# Patient Record
Sex: FEMALE | Race: WHITE | NOT HISPANIC OR LATINO | Employment: OTHER | URBAN - NONMETROPOLITAN AREA
[De-identification: names, ages, dates, MRNs, and addresses within clinical notes are randomized per-mention and may not be internally consistent; named-entity substitution may affect disease eponyms.]

---

## 2017-02-07 ENCOUNTER — TRANSCRIBE ORDERS (OUTPATIENT)
Dept: ADMINISTRATIVE | Facility: HOSPITAL | Age: 72
End: 2017-02-07

## 2017-02-07 DIAGNOSIS — H53.2 DOUBLE VISION: Primary | ICD-10-CM

## 2017-02-10 ENCOUNTER — APPOINTMENT (OUTPATIENT)
Dept: CT IMAGING | Facility: HOSPITAL | Age: 72
End: 2017-02-10
Attending: OPHTHALMOLOGY

## 2017-02-16 ENCOUNTER — HOSPITAL ENCOUNTER (OUTPATIENT)
Dept: CT IMAGING | Facility: HOSPITAL | Age: 72
Discharge: HOME | End: 2017-02-16
Attending: OPHTHALMOLOGY

## 2017-02-16 ENCOUNTER — HOSPITAL ENCOUNTER (OUTPATIENT)
Dept: ULTRASOUND IMAGING | Facility: HOSPITAL | Age: 72
Discharge: HOME | End: 2017-02-16
Attending: OPHTHALMOLOGY | Admitting: OPHTHALMOLOGY

## 2017-02-16 DIAGNOSIS — H53.2 DOUBLE VISION: ICD-10-CM

## 2017-02-16 LAB — CREAT BLDA-MCNC: 0.8 MG/DL (ref 0.6–1.3)

## 2017-02-16 PROCEDURE — 70470 CT HEAD/BRAIN W/O & W/DYE: CPT

## 2017-02-16 PROCEDURE — 93880 EXTRACRANIAL BILAT STUDY: CPT | Performed by: SURGERY

## 2017-02-16 PROCEDURE — 93880 EXTRACRANIAL BILAT STUDY: CPT

## 2017-02-16 PROCEDURE — 0 IOPAMIDOL 61 % SOLUTION: Performed by: OPHTHALMOLOGY

## 2017-02-16 PROCEDURE — 82565 ASSAY OF CREATININE: CPT

## 2017-02-16 RX ADMIN — IOPAMIDOL 100 ML: 612 INJECTION, SOLUTION INTRAVENOUS at 13:15

## 2017-05-02 ENCOUNTER — OFFICE VISIT (OUTPATIENT)
Dept: SURGERY | Age: 72
End: 2017-05-02
Payer: MEDICARE

## 2017-05-02 VITALS
BODY MASS INDEX: 32.39 KG/M2 | WEIGHT: 176 LBS | TEMPERATURE: 98.5 F | SYSTOLIC BLOOD PRESSURE: 130 MMHG | HEIGHT: 62 IN | DIASTOLIC BLOOD PRESSURE: 72 MMHG

## 2017-05-02 DIAGNOSIS — K64.2 PROLAPSED INTERNAL HEMORRHOIDS, GRADE 3: Primary | ICD-10-CM

## 2017-05-02 PROCEDURE — 1090F PRES/ABSN URINE INCON ASSESS: CPT | Performed by: PHYSICIAN ASSISTANT

## 2017-05-02 PROCEDURE — G8400 PT W/DXA NO RESULTS DOC: HCPCS | Performed by: PHYSICIAN ASSISTANT

## 2017-05-02 PROCEDURE — 99203 OFFICE O/P NEW LOW 30 MIN: CPT | Performed by: PHYSICIAN ASSISTANT

## 2017-05-02 PROCEDURE — 3017F COLORECTAL CA SCREEN DOC REV: CPT | Performed by: PHYSICIAN ASSISTANT

## 2017-05-02 PROCEDURE — 4040F PNEUMOC VAC/ADMIN/RCVD: CPT | Performed by: PHYSICIAN ASSISTANT

## 2017-05-02 PROCEDURE — G8427 DOCREV CUR MEDS BY ELIG CLIN: HCPCS | Performed by: PHYSICIAN ASSISTANT

## 2017-05-02 PROCEDURE — G8419 CALC BMI OUT NRM PARAM NOF/U: HCPCS | Performed by: PHYSICIAN ASSISTANT

## 2017-05-02 PROCEDURE — 3014F SCREEN MAMMO DOC REV: CPT | Performed by: PHYSICIAN ASSISTANT

## 2017-05-02 PROCEDURE — 1036F TOBACCO NON-USER: CPT | Performed by: PHYSICIAN ASSISTANT

## 2017-05-02 PROCEDURE — 1123F ACP DISCUSS/DSCN MKR DOCD: CPT | Performed by: PHYSICIAN ASSISTANT

## 2017-05-02 RX ORDER — SIMVASTATIN 20 MG
20 TABLET ORAL DAILY
COMMUNITY
Start: 2017-03-30

## 2017-05-02 RX ORDER — MECLIZINE HYDROCHLORIDE CHEWABLE TABLETS 25 MG/1
25 TABLET, CHEWABLE ORAL 3 TIMES DAILY PRN
COMMUNITY

## 2017-05-02 RX ORDER — TRIAMTERENE AND HYDROCHLOROTHIAZIDE 37.5; 25 MG/1; MG/1
1 CAPSULE ORAL EVERY MORNING
COMMUNITY
Start: 2017-03-30

## 2017-05-02 RX ORDER — METRONIDAZOLE 500 MG/1
500 TABLET ORAL 3 TIMES DAILY PRN
Status: ON HOLD | COMMUNITY
End: 2017-05-12 | Stop reason: ALTCHOICE

## 2017-05-02 RX ORDER — ATENOLOL 25 MG/1
25 TABLET ORAL DAILY
COMMUNITY
Start: 2017-03-30

## 2017-05-02 RX ORDER — INDOMETHACIN 50 MG/1
50 CAPSULE ORAL NIGHTLY PRN
COMMUNITY

## 2017-05-02 RX ORDER — TRAMADOL HYDROCHLORIDE 50 MG/1
50 TABLET ORAL EVERY 6 HOURS PRN
COMMUNITY

## 2017-05-02 RX ORDER — PHENOL 1.4 %
1 AEROSOL, SPRAY (ML) MUCOUS MEMBRANE DAILY
COMMUNITY

## 2017-05-05 ENCOUNTER — HOSPITAL ENCOUNTER (OUTPATIENT)
Dept: PREADMISSION TESTING | Age: 72
Discharge: HOME | End: 2017-05-05
Payer: MEDICARE

## 2017-05-05 VITALS — WEIGHT: 169 LBS | HEIGHT: 62 IN | BODY MASS INDEX: 31.1 KG/M2

## 2017-05-05 LAB
ANION GAP SERPL CALCULATED.3IONS-SCNC: 12 MMOL/L (ref 7–19)
BASOPHILS ABSOLUTE: 0.1 K/UL (ref 0–0.2)
BASOPHILS RELATIVE PERCENT: 0.7 % (ref 0–1)
BUN BLDV-MCNC: 14 MG/DL (ref 8–23)
CALCIUM SERPL-MCNC: 9.7 MG/DL (ref 8.8–10.2)
CHLORIDE BLD-SCNC: 102 MMOL/L (ref 98–111)
CO2: 28 MMOL/L (ref 22–29)
CREAT SERPL-MCNC: 0.8 MG/DL (ref 0.5–0.9)
EOSINOPHILS ABSOLUTE: 0.3 K/UL (ref 0–0.6)
EOSINOPHILS RELATIVE PERCENT: 3.8 % (ref 0–5)
GFR NON-AFRICAN AMERICAN: >60
GLUCOSE BLD-MCNC: 74 MG/DL (ref 74–109)
HCT VFR BLD CALC: 37.7 % (ref 37–47)
HEMOGLOBIN: 12.2 G/DL (ref 12–16)
LYMPHOCYTES ABSOLUTE: 2.1 K/UL (ref 1.1–4.5)
LYMPHOCYTES RELATIVE PERCENT: 30.4 % (ref 20–40)
MCH RBC QN AUTO: 30.4 PG (ref 27–31)
MCHC RBC AUTO-ENTMCNC: 32.4 G/DL (ref 33–37)
MCV RBC AUTO: 94 FL (ref 81–99)
MONOCYTES ABSOLUTE: 0.5 K/UL (ref 0–0.9)
MONOCYTES RELATIVE PERCENT: 7.5 % (ref 0–10)
NEUTROPHILS ABSOLUTE: 3.9 K/UL (ref 1.5–7.5)
NEUTROPHILS RELATIVE PERCENT: 57.3 % (ref 50–65)
PDW BLD-RTO: 12.8 % (ref 11.5–14.5)
PLATELET # BLD: 236 K/UL (ref 130–400)
PMV BLD AUTO: 11.6 FL (ref 7.4–10.4)
POTASSIUM SERPL-SCNC: 3.9 MMOL/L (ref 3.5–5)
RBC # BLD: 4.01 M/UL (ref 4.2–5.4)
SODIUM BLD-SCNC: 142 MMOL/L (ref 136–145)
WBC # BLD: 6.8 K/UL (ref 4.8–10.8)

## 2017-05-05 PROCEDURE — 85025 COMPLETE CBC W/AUTO DIFF WBC: CPT

## 2017-05-05 PROCEDURE — 80048 BASIC METABOLIC PNL TOTAL CA: CPT

## 2017-05-05 PROCEDURE — 93005 ELECTROCARDIOGRAM TRACING: CPT

## 2017-05-08 ENCOUNTER — PREP FOR PROCEDURE (OUTPATIENT)
Dept: SURGERY | Age: 72
End: 2017-05-08

## 2017-05-08 LAB
EKG P AXIS: 3 DEGREES
EKG P-R INTERVAL: 152 MS
EKG Q-T INTERVAL: 394 MS
EKG QRS DURATION: 78 MS
EKG QTC CALCULATION (BAZETT): 393 MS
EKG T AXIS: 3 DEGREES

## 2017-05-08 RX ORDER — SODIUM CHLORIDE, SODIUM LACTATE, POTASSIUM CHLORIDE, CALCIUM CHLORIDE 600; 310; 30; 20 MG/100ML; MG/100ML; MG/100ML; MG/100ML
INJECTION, SOLUTION INTRAVENOUS CONTINUOUS
Status: CANCELLED | OUTPATIENT
Start: 2017-05-08

## 2017-05-08 RX ORDER — SODIUM CHLORIDE 0.9 % (FLUSH) 0.9 %
10 SYRINGE (ML) INJECTION EVERY 12 HOURS SCHEDULED
Status: CANCELLED | OUTPATIENT
Start: 2017-05-08

## 2017-05-08 RX ORDER — SODIUM CHLORIDE 0.9 % (FLUSH) 0.9 %
10 SYRINGE (ML) INJECTION PRN
Status: CANCELLED | OUTPATIENT
Start: 2017-05-08

## 2017-05-08 ASSESSMENT — ENCOUNTER SYMPTOMS
CONSTIPATION: 0
APNEA: 1
WHEEZING: 0
ANAL BLEEDING: 1
ALLERGIC/IMMUNOLOGIC NEGATIVE: 1
BLOOD IN STOOL: 1
DIARRHEA: 0
COUGH: 0
ABDOMINAL PAIN: 0
SHORTNESS OF BREATH: 0
EYES NEGATIVE: 1
NAUSEA: 0
VOMITING: 0
BACK PAIN: 1
ABDOMINAL DISTENTION: 0

## 2017-05-12 ENCOUNTER — ANESTHESIA (OUTPATIENT)
Dept: OPERATING ROOM | Age: 72
End: 2017-05-12
Payer: MEDICARE

## 2017-05-12 ENCOUNTER — HOSPITAL ENCOUNTER (OUTPATIENT)
Age: 72
Setting detail: OUTPATIENT SURGERY
Discharge: HOME | End: 2017-05-12
Attending: SURGERY | Admitting: SURGERY
Payer: MEDICARE

## 2017-05-12 ENCOUNTER — ANESTHESIA EVENT (OUTPATIENT)
Dept: OPERATING ROOM | Age: 72
End: 2017-05-12
Payer: MEDICARE

## 2017-05-12 VITALS
RESPIRATION RATE: 7 BRPM | DIASTOLIC BLOOD PRESSURE: 48 MMHG | SYSTOLIC BLOOD PRESSURE: 100 MMHG | OXYGEN SATURATION: 100 %

## 2017-05-12 VITALS
SYSTOLIC BLOOD PRESSURE: 144 MMHG | OXYGEN SATURATION: 98 % | TEMPERATURE: 97.2 F | HEART RATE: 55 BPM | RESPIRATION RATE: 16 BRPM | DIASTOLIC BLOOD PRESSURE: 63 MMHG

## 2017-05-12 PROCEDURE — 6370000000 HC RX 637 (ALT 250 FOR IP): Performed by: ANESTHESIOLOGY

## 2017-05-12 PROCEDURE — 2580000003 HC RX 258: Performed by: PHYSICIAN ASSISTANT

## 2017-05-12 PROCEDURE — 3600000003 HC SURGERY LEVEL 3 BASE: Performed by: SURGERY

## 2017-05-12 PROCEDURE — 2580000003 HC RX 258

## 2017-05-12 PROCEDURE — 3700000001 HC ADD 15 MINUTES (ANESTHESIA): Performed by: SURGERY

## 2017-05-12 PROCEDURE — 6360000002 HC RX W HCPCS: Performed by: ANESTHESIOLOGY

## 2017-05-12 PROCEDURE — 6360000002 HC RX W HCPCS: Performed by: PHYSICIAN ASSISTANT

## 2017-05-12 PROCEDURE — 88304 TISSUE EXAM BY PATHOLOGIST: CPT

## 2017-05-12 PROCEDURE — 2500000003 HC RX 250 WO HCPCS: Performed by: SURGERY

## 2017-05-12 PROCEDURE — 99999 PR OFFICE/OUTPT VISIT,PROCEDURE ONLY: CPT | Performed by: PHYSICIAN ASSISTANT

## 2017-05-12 PROCEDURE — 3700000000 HC ANESTHESIA ATTENDED CARE: Performed by: SURGERY

## 2017-05-12 PROCEDURE — 6360000002 HC RX W HCPCS

## 2017-05-12 PROCEDURE — 46260 REMOVE IN/EX HEM GROUPS 2+: CPT | Performed by: SURGERY

## 2017-05-12 PROCEDURE — 3600000013 HC SURGERY LEVEL 3 ADDTL 15MIN: Performed by: SURGERY

## 2017-05-12 PROCEDURE — 2500000003 HC RX 250 WO HCPCS

## 2017-05-12 PROCEDURE — 7100000010 HC PHASE II RECOVERY - FIRST 15 MIN: Performed by: SURGERY

## 2017-05-12 PROCEDURE — 7100000011 HC PHASE II RECOVERY - ADDTL 15 MIN: Performed by: SURGERY

## 2017-05-12 PROCEDURE — 7100000001 HC PACU RECOVERY - ADDTL 15 MIN: Performed by: SURGERY

## 2017-05-12 PROCEDURE — 7100000000 HC PACU RECOVERY - FIRST 15 MIN: Performed by: SURGERY

## 2017-05-12 PROCEDURE — 2720000001 HC MISC SURG SUPPLY STERILE $51-500: Performed by: SURGERY

## 2017-05-12 RX ORDER — METOCLOPRAMIDE HYDROCHLORIDE 5 MG/ML
10 INJECTION INTRAMUSCULAR; INTRAVENOUS
Status: DISCONTINUED | OUTPATIENT
Start: 2017-05-12 | End: 2017-05-12 | Stop reason: HOSPADM

## 2017-05-12 RX ORDER — ONDANSETRON 2 MG/ML
4 INJECTION INTRAMUSCULAR; INTRAVENOUS EVERY 6 HOURS PRN
Status: CANCELLED | OUTPATIENT
Start: 2017-05-12

## 2017-05-12 RX ORDER — MORPHINE SULFATE 4 MG/ML
4 INJECTION, SOLUTION INTRAMUSCULAR; INTRAVENOUS EVERY 5 MIN PRN
Status: DISCONTINUED | OUTPATIENT
Start: 2017-05-12 | End: 2017-05-12 | Stop reason: HOSPADM

## 2017-05-12 RX ORDER — SODIUM CHLORIDE 0.9 % (FLUSH) 0.9 %
10 SYRINGE (ML) INJECTION PRN
Status: CANCELLED | OUTPATIENT
Start: 2017-05-12

## 2017-05-12 RX ORDER — MORPHINE SULFATE 4 MG/ML
2 INJECTION, SOLUTION INTRAMUSCULAR; INTRAVENOUS EVERY 5 MIN PRN
Status: DISCONTINUED | OUTPATIENT
Start: 2017-05-12 | End: 2017-05-12 | Stop reason: HOSPADM

## 2017-05-12 RX ORDER — DIPHENHYDRAMINE HYDROCHLORIDE 50 MG/ML
12.5 INJECTION INTRAMUSCULAR; INTRAVENOUS
Status: DISCONTINUED | OUTPATIENT
Start: 2017-05-12 | End: 2017-05-12 | Stop reason: HOSPADM

## 2017-05-12 RX ORDER — BUPIVACAINE HYDROCHLORIDE AND EPINEPHRINE 2.5; 5 MG/ML; UG/ML
INJECTION, SOLUTION INFILTRATION; PERINEURAL PRN
Status: DISCONTINUED | OUTPATIENT
Start: 2017-05-12 | End: 2017-05-12 | Stop reason: HOSPADM

## 2017-05-12 RX ORDER — PROMETHAZINE HYDROCHLORIDE 25 MG/ML
6.25 INJECTION, SOLUTION INTRAMUSCULAR; INTRAVENOUS
Status: DISCONTINUED | OUTPATIENT
Start: 2017-05-12 | End: 2017-05-12 | Stop reason: HOSPADM

## 2017-05-12 RX ORDER — APREPITANT 40 MG/1
40 CAPSULE ORAL ONCE
Status: COMPLETED | OUTPATIENT
Start: 2017-05-12 | End: 2017-05-12

## 2017-05-12 RX ORDER — LABETALOL HYDROCHLORIDE 5 MG/ML
5 INJECTION, SOLUTION INTRAVENOUS EVERY 10 MIN PRN
Status: DISCONTINUED | OUTPATIENT
Start: 2017-05-12 | End: 2017-05-12 | Stop reason: HOSPADM

## 2017-05-12 RX ORDER — MEPERIDINE HYDROCHLORIDE 50 MG/ML
12.5 INJECTION INTRAMUSCULAR; INTRAVENOUS; SUBCUTANEOUS EVERY 5 MIN PRN
Status: DISCONTINUED | OUTPATIENT
Start: 2017-05-12 | End: 2017-05-12 | Stop reason: HOSPADM

## 2017-05-12 RX ORDER — LIDOCAINE HYDROCHLORIDE 10 MG/ML
INJECTION, SOLUTION EPIDURAL; INFILTRATION; INTRACAUDAL; PERINEURAL PRN
Status: DISCONTINUED | OUTPATIENT
Start: 2017-05-12 | End: 2017-05-12 | Stop reason: SDUPTHER

## 2017-05-12 RX ORDER — SODIUM CHLORIDE, SODIUM LACTATE, POTASSIUM CHLORIDE, CALCIUM CHLORIDE 600; 310; 30; 20 MG/100ML; MG/100ML; MG/100ML; MG/100ML
INJECTION, SOLUTION INTRAVENOUS CONTINUOUS
Status: DISCONTINUED | OUTPATIENT
Start: 2017-05-12 | End: 2017-05-12 | Stop reason: HOSPADM

## 2017-05-12 RX ORDER — ACETAMINOPHEN 325 MG/1
650 TABLET ORAL EVERY 4 HOURS PRN
Status: CANCELLED | OUTPATIENT
Start: 2017-05-12

## 2017-05-12 RX ORDER — SODIUM CHLORIDE 0.9 % (FLUSH) 0.9 %
10 SYRINGE (ML) INJECTION PRN
Status: DISCONTINUED | OUTPATIENT
Start: 2017-05-12 | End: 2017-05-12 | Stop reason: HOSPADM

## 2017-05-12 RX ORDER — HYDRALAZINE HYDROCHLORIDE 20 MG/ML
5 INJECTION INTRAMUSCULAR; INTRAVENOUS EVERY 10 MIN PRN
Status: DISCONTINUED | OUTPATIENT
Start: 2017-05-12 | End: 2017-05-12 | Stop reason: HOSPADM

## 2017-05-12 RX ORDER — PROPOFOL 10 MG/ML
INJECTION, EMULSION INTRAVENOUS PRN
Status: DISCONTINUED | OUTPATIENT
Start: 2017-05-12 | End: 2017-05-12 | Stop reason: SDUPTHER

## 2017-05-12 RX ORDER — ONDANSETRON 2 MG/ML
INJECTION INTRAMUSCULAR; INTRAVENOUS PRN
Status: DISCONTINUED | OUTPATIENT
Start: 2017-05-12 | End: 2017-05-12 | Stop reason: SDUPTHER

## 2017-05-12 RX ORDER — HYDROCODONE BITARTRATE AND ACETAMINOPHEN 5; 325 MG/1; MG/1
2 TABLET ORAL EVERY 4 HOURS PRN
Status: CANCELLED | OUTPATIENT
Start: 2017-05-12

## 2017-05-12 RX ORDER — SODIUM CHLORIDE, SODIUM LACTATE, POTASSIUM CHLORIDE, CALCIUM CHLORIDE 600; 310; 30; 20 MG/100ML; MG/100ML; MG/100ML; MG/100ML
INJECTION, SOLUTION INTRAVENOUS CONTINUOUS PRN
Status: DISCONTINUED | OUTPATIENT
Start: 2017-05-12 | End: 2017-05-12 | Stop reason: SDUPTHER

## 2017-05-12 RX ORDER — ENALAPRILAT 2.5 MG/2ML
1.25 INJECTION INTRAVENOUS
Status: DISCONTINUED | OUTPATIENT
Start: 2017-05-12 | End: 2017-05-12 | Stop reason: HOSPADM

## 2017-05-12 RX ORDER — MORPHINE SULFATE 4 MG/ML
2 INJECTION, SOLUTION INTRAMUSCULAR; INTRAVENOUS
Status: CANCELLED | OUTPATIENT
Start: 2017-05-12

## 2017-05-12 RX ORDER — HYDROCODONE BITARTRATE AND ACETAMINOPHEN 5; 325 MG/1; MG/1
TABLET ORAL
Qty: 35 TABLET | Refills: 0 | Status: SHIPPED | OUTPATIENT
Start: 2017-05-12 | End: 2017-05-25 | Stop reason: ALTCHOICE

## 2017-05-12 RX ORDER — MORPHINE SULFATE 4 MG/ML
4 INJECTION, SOLUTION INTRAMUSCULAR; INTRAVENOUS
Status: CANCELLED | OUTPATIENT
Start: 2017-05-12

## 2017-05-12 RX ORDER — FENTANYL CITRATE 50 UG/ML
INJECTION, SOLUTION INTRAMUSCULAR; INTRAVENOUS PRN
Status: DISCONTINUED | OUTPATIENT
Start: 2017-05-12 | End: 2017-05-12 | Stop reason: SDUPTHER

## 2017-05-12 RX ORDER — SODIUM CHLORIDE 0.9 % (FLUSH) 0.9 %
10 SYRINGE (ML) INJECTION EVERY 12 HOURS SCHEDULED
Status: DISCONTINUED | OUTPATIENT
Start: 2017-05-12 | End: 2017-05-12 | Stop reason: HOSPADM

## 2017-05-12 RX ORDER — SODIUM CHLORIDE 0.9 % (FLUSH) 0.9 %
10 SYRINGE (ML) INJECTION EVERY 12 HOURS SCHEDULED
Status: CANCELLED | OUTPATIENT
Start: 2017-05-12

## 2017-05-12 RX ORDER — SCOLOPAMINE TRANSDERMAL SYSTEM 1 MG/1
1 PATCH, EXTENDED RELEASE TRANSDERMAL ONCE
Status: DISCONTINUED | OUTPATIENT
Start: 2017-05-12 | End: 2017-05-12 | Stop reason: HOSPADM

## 2017-05-12 RX ORDER — MIDAZOLAM HYDROCHLORIDE 1 MG/ML
INJECTION INTRAMUSCULAR; INTRAVENOUS PRN
Status: DISCONTINUED | OUTPATIENT
Start: 2017-05-12 | End: 2017-05-12 | Stop reason: SDUPTHER

## 2017-05-12 RX ORDER — DEXAMETHASONE SODIUM PHOSPHATE 10 MG/ML
INJECTION INTRAMUSCULAR; INTRAVENOUS PRN
Status: DISCONTINUED | OUTPATIENT
Start: 2017-05-12 | End: 2017-05-12 | Stop reason: SDUPTHER

## 2017-05-12 RX ORDER — HYDROCODONE BITARTRATE AND ACETAMINOPHEN 5; 325 MG/1; MG/1
1 TABLET ORAL EVERY 4 HOURS PRN
Status: CANCELLED | OUTPATIENT
Start: 2017-05-12

## 2017-05-12 RX ADMIN — DEXAMETHASONE SODIUM PHOSPHATE 10 MG: 10 INJECTION INTRAMUSCULAR; INTRAVENOUS at 10:01

## 2017-05-12 RX ADMIN — MIDAZOLAM HYDROCHLORIDE 1 MG: 1 INJECTION, SOLUTION INTRAMUSCULAR; INTRAVENOUS at 09:42

## 2017-05-12 RX ADMIN — ONDANSETRON HYDROCHLORIDE 4 MG: 2 INJECTION, SOLUTION INTRAVENOUS at 10:01

## 2017-05-12 RX ADMIN — SODIUM CHLORIDE, SODIUM LACTATE, POTASSIUM CHLORIDE, AND CALCIUM CHLORIDE: 600; 310; 30; 20 INJECTION, SOLUTION INTRAVENOUS at 09:43

## 2017-05-12 RX ADMIN — Medication 2 G: at 09:50

## 2017-05-12 RX ADMIN — LIDOCAINE HYDROCHLORIDE 30 MG: 10 INJECTION, SOLUTION EPIDURAL; INFILTRATION; INTRACAUDAL; PERINEURAL at 09:48

## 2017-05-12 RX ADMIN — SODIUM CHLORIDE, SODIUM LACTATE, POTASSIUM CHLORIDE, AND CALCIUM CHLORIDE: 600; 310; 30; 20 INJECTION, SOLUTION INTRAVENOUS at 07:30

## 2017-05-12 RX ADMIN — APREPITANT 40 MG: 40 CAPSULE ORAL at 07:30

## 2017-05-12 RX ADMIN — PROPOFOL 100 MG: 10 INJECTION, EMULSION INTRAVENOUS at 09:48

## 2017-05-12 RX ADMIN — FENTANYL CITRATE 50 MCG: 50 INJECTION INTRAMUSCULAR; INTRAVENOUS at 09:48

## 2017-05-12 ASSESSMENT — PAIN DESCRIPTION - LOCATION: LOCATION: BUTTOCKS

## 2017-05-12 ASSESSMENT — PAIN DESCRIPTION - DESCRIPTORS: DESCRIPTORS: BURNING

## 2017-05-12 ASSESSMENT — PAIN DESCRIPTION - PROGRESSION: CLINICAL_PROGRESSION: NOT CHANGED

## 2017-05-12 ASSESSMENT — PAIN DESCRIPTION - ONSET: ONSET: AWAKENED FROM SLEEP

## 2017-05-12 ASSESSMENT — PAIN SCALES - GENERAL: PAINLEVEL_OUTOF10: 0

## 2017-05-12 ASSESSMENT — PAIN DESCRIPTION - FREQUENCY: FREQUENCY: CONTINUOUS

## 2017-05-25 ENCOUNTER — OFFICE VISIT (OUTPATIENT)
Dept: SURGERY | Age: 72
End: 2017-05-25

## 2017-05-25 VITALS
WEIGHT: 169.8 LBS | HEIGHT: 62 IN | BODY MASS INDEX: 31.25 KG/M2 | TEMPERATURE: 97 F | SYSTOLIC BLOOD PRESSURE: 120 MMHG | DIASTOLIC BLOOD PRESSURE: 72 MMHG

## 2017-05-25 DIAGNOSIS — Z87.19 S/P HEMORRHOIDECTOMY: Primary | ICD-10-CM

## 2017-05-25 DIAGNOSIS — Z98.890 S/P HEMORRHOIDECTOMY: Primary | ICD-10-CM

## 2017-05-25 PROCEDURE — 99024 POSTOP FOLLOW-UP VISIT: CPT | Performed by: SURGERY

## 2017-06-26 ENCOUNTER — OFFICE VISIT (OUTPATIENT)
Dept: SURGERY | Age: 72
End: 2017-06-26

## 2017-06-26 ENCOUNTER — DIRECT ADMIT ORDERS (OUTPATIENT)
Dept: SURGERY | Age: 72
End: 2017-06-26

## 2017-06-26 VITALS
BODY MASS INDEX: 31.91 KG/M2 | TEMPERATURE: 97.2 F | SYSTOLIC BLOOD PRESSURE: 124 MMHG | DIASTOLIC BLOOD PRESSURE: 78 MMHG | WEIGHT: 173.4 LBS | HEIGHT: 62 IN

## 2017-06-26 DIAGNOSIS — Z87.19 S/P HEMORRHOIDECTOMY: Primary | ICD-10-CM

## 2017-06-26 DIAGNOSIS — Z98.890 S/P HEMORRHOIDECTOMY: Primary | ICD-10-CM

## 2017-06-26 PROCEDURE — 99024 POSTOP FOLLOW-UP VISIT: CPT | Performed by: SURGERY

## 2017-10-24 ENCOUNTER — OFFICE VISIT (OUTPATIENT)
Dept: OBSTETRICS AND GYNECOLOGY | Facility: CLINIC | Age: 72
End: 2017-10-24

## 2017-10-24 VITALS
WEIGHT: 170 LBS | DIASTOLIC BLOOD PRESSURE: 80 MMHG | SYSTOLIC BLOOD PRESSURE: 136 MMHG | BODY MASS INDEX: 31.28 KG/M2 | HEIGHT: 62 IN

## 2017-10-24 DIAGNOSIS — N95.2 VAGINAL ATROPHY: ICD-10-CM

## 2017-10-24 DIAGNOSIS — Z78.9 NONSMOKER: ICD-10-CM

## 2017-10-24 DIAGNOSIS — N39.41 URGE INCONTINENCE: ICD-10-CM

## 2017-10-24 DIAGNOSIS — Z01.411 ENCOUNTER FOR GYNECOLOGICAL EXAMINATION WITH ABNORMAL FINDING: Primary | ICD-10-CM

## 2017-10-24 PROCEDURE — G0101 CA SCREEN;PELVIC/BREAST EXAM: HCPCS | Performed by: OBSTETRICS & GYNECOLOGY

## 2017-10-24 RX ORDER — ATENOLOL 25 MG/1
25 TABLET ORAL
COMMUNITY
Start: 2017-03-30

## 2017-10-24 RX ORDER — SIMVASTATIN 20 MG
20 TABLET ORAL
COMMUNITY
Start: 2017-03-30

## 2017-10-24 RX ORDER — MULTIVIT AND MINERALS-FERROUS GLUCONATE 9 MG IRON/15 ML ORAL LIQUID 9 MG/15 ML
LIQUID (ML) ORAL DAILY
COMMUNITY

## 2017-10-24 RX ORDER — TRIAMTERENE AND HYDROCHLOROTHIAZIDE 37.5; 25 MG/1; MG/1
CAPSULE ORAL
COMMUNITY
Start: 2017-03-30

## 2017-10-24 RX ORDER — AMOXICILLIN 250 MG
CAPSULE ORAL
COMMUNITY
End: 2023-03-23

## 2017-10-24 RX ORDER — PHENOL 1.4 %
AEROSOL, SPRAY (ML) MUCOUS MEMBRANE
COMMUNITY

## 2017-10-24 RX ORDER — MECLIZINE HCL 25MG 25 MG/1
25 TABLET, CHEWABLE ORAL
COMMUNITY

## 2017-10-24 RX ORDER — INDOMETHACIN 50 MG/1
50 CAPSULE ORAL
COMMUNITY

## 2017-10-24 NOTE — PROGRESS NOTES
"Subjective   Chief Complaint   Patient presents with   • Annual Exam     pt here today for annual exam. pt says that she has had recurrent UTI's in the past. pt voices no other concerns.      Keren Andrew is a 72 y.o. year old menopausal female presenting to be seen for her annual exam.  She is very unclear about her previous gyn surgical history.  She is sure that she had a \"bladder tie up\", but then reports that she either had a D&C/cone or a \"partial hysterectomy\", but is not sure.  The patient denies any vaginal bleeding in the past 12 months  Hot flashes and night sweats are not a significant problem.    She is occasionally sexually active.  In the past year there has not been new sexual partners.  She says they occasionally still try, but have some difficulty.    Patient reports regular self breast exams: no  She exercises regularly: yes.  Walking several days/week    She has noticed changes in height: no.    No Additional Complaints Reported    The following portions of the patient's history were reviewed and updated as appropriate:problem list, current medications, allergies, past family history, past medical history, past social history and past surgical history.  Smoking status: Never Smoker                                                              Smokeless status: Never Used                        Review of Systems   Constitutional: Negative for activity change and unexpected weight change.   HENT: Negative for congestion.    Eyes: Positive for visual disturbance (wears glasses).   Respiratory: Negative for shortness of breath.    Cardiovascular: Positive for chest pain (esophageal spasms).   Gastrointestinal: Positive for constipation (chronic constipation, not a good water drinker). Negative for abdominal pain, blood in stool and diarrhea.   Endocrine: Negative for cold intolerance and heat intolerance.   Genitourinary: Positive for dyspareunia and enuresis (urge incontinence, especially if she is " "too full). Negative for difficulty urinating, pelvic pain, vaginal bleeding and vaginal discharge.   Musculoskeletal: Negative for arthralgias, back pain, neck pain and neck stiffness.   Skin: Negative for rash.   Neurological: Positive for dizziness (vertigo). Negative for headaches.   Psychiatric/Behavioral: Negative for sleep disturbance. The patient is not nervous/anxious.          Objective   /80  Ht 62\" (157.5 cm)  Wt 170 lb (77.1 kg)  BMI 31.09 kg/m2  Physical Exam   Constitutional: She is oriented to person, place, and time. She appears well-developed and well-nourished. No distress.   HENT:   Head: Normocephalic and atraumatic.   Eyes: EOM are normal.   Neck: Normal range of motion. Neck supple. No thyromegaly present.   Cardiovascular: Normal rate and regular rhythm.    No murmur heard.  Pulmonary/Chest: Effort normal and breath sounds normal. Right breast exhibits no inverted nipple and no mass. Left breast exhibits no inverted nipple and no mass.   Abdominal: Soft. She exhibits no distension. There is no tenderness.   Genitourinary: Vagina normal. Rectal exam shows anal tone normal. No breast tenderness or discharge. Pelvic exam was performed with patient supine. There is no tenderness or lesion on the right labia. There is no tenderness or lesion on the left labia. Right adnexum displays no tenderness and no fullness. Left adnexum displays no tenderness and no fullness. No bleeding in the vagina. No vaginal discharge found.   Genitourinary Comments: Patient s/p hysterectomy:  Uterus and cervix surgically absent.  Vaginal cuff unremarkable.  Mucosa pale with loss of rugae.  Labia normal, no lesions noted.  Urethral meatus unremarkable, no prolapse of mucosa.  Anus/perineum normal   Musculoskeletal: Normal range of motion.   Neurological: She is alert and oriented to person, place, and time.   Skin: Skin is warm and dry.   Psychiatric: She has a normal mood and affect. Her behavior is normal. " Judgment normal.   Nursing note and vitals reviewed.         Assessment & Plan    Keren was seen today for annual exam.    Diagnoses and all orders for this visit:    Encounter for gynecological examination with abnormal finding: Written order given for mammogram; she will take it to [facility].  Patient s/p hysterectomy.  PAP not indicated; explained to patient.  Screening labs with PCP.  Return last bone density was 3 years ago and was normal.  She also reports that her last colonoscopy was 3 years ago and was normal.  The patient has 30 had her flu shot for this year.    Vaginal atrophy:  Discussed vaginal Premarin cream and Tere Alexandra touch as options for vaginal dryness that is causing the patient dyspareunia.  She has previously used Premarin vaginal cream, but it was for mild prolapse symptoms.  She does not want any prescription at this time.  The patient was given a brochure about Tere Alexandra touch.    Urge incontinence: Encourage the patient to try eliminating bladder irritants from her diet.  She volunteered that eliminating Diet Coke from her diet several years ago made a tremendous difference.  She was also encouraged to consider pelvic physical therapy.    Nonsmoker    BMI 31.0-31.9,adult      This note was electronically signed.    Kelly Milian MD  10/24/2017  1:37 PM

## 2017-10-26 ENCOUNTER — HOSPITAL ENCOUNTER (OUTPATIENT)
Dept: HOSPITAL 58 - RAD | Age: 72
Discharge: HOME | End: 2017-10-26
Attending: OBSTETRICS & GYNECOLOGY

## 2017-10-26 VITALS — BODY MASS INDEX: 29.2 KG/M2

## 2017-10-26 DIAGNOSIS — Z12.31: Primary | ICD-10-CM

## 2017-10-26 PROCEDURE — 77067 SCR MAMMO BI INCL CAD: CPT

## 2017-10-27 NOTE — MAMMO
EXAM:  Bilateral digital screening mammogram (2-D and 3-D) 

  

History:  Screening 

  

Comparison:  Bilateral mammogram 09/30/2015 

  

Findings:  MLO and CC views of bilateral breasts demonstrate scattered fibroglandular breast parenchy
ma.  CAD was reviewed by the radiologist.  Tomosynthesis was performed.  There are no dominant masses
, no suspicious microcalcifications and no architectural distortions 

  

Impression:  Stable negative mammogram.  Recommend followup routine screening mammography in 1 year. 


  

BIRADS 1

## 2018-07-10 ENCOUNTER — HOSPITAL ENCOUNTER (OUTPATIENT)
Dept: HOSPITAL 58 - RAD | Age: 73
Discharge: HOME | End: 2018-07-10
Attending: INTERNAL MEDICINE

## 2018-07-10 VITALS — BODY MASS INDEX: 29.2 KG/M2

## 2018-07-10 DIAGNOSIS — M25.472: ICD-10-CM

## 2018-07-10 DIAGNOSIS — M25.572: Primary | ICD-10-CM

## 2018-07-10 NOTE — DI
EXAM: Left foot three view 

  

HISTORY: 

Pain and swelling 

COMPARISON: None available at the time of dictation. 

  

FINDINGS: 

  

No definitive left foot acute fracture or dislocation is identified. There is moderate appearing join
t space loss seen at the left first metatarsophalangeal joint suggestive of degenerative arthritis.  
There is a tiny posterior left calcaneal spur present.. No definitive soft tissue  radiodense foreign
 bodies are identified. 

  

IMPRESSION: 

  

No left foot acute fracture or dislocation identified. 

  

Moderate appearing degenerative changes seen at the left first metatarsophalangeal joint. 

  

Tiny posterior left calcaneal spur.

## 2018-07-10 NOTE — DI
EXAM:  LEFT ANKLE 3 VIEWS 

  

HISTORY:  Pain and swelling 

  

FINDINGS:  No comparison. Trabecular markings are accentuated consistent with at least mild, diffuse 
demineralization.  There is bony spurring of the fibular tip.  Tibiotalar joint is within normal limi
ts.  There is no joint effusion, joint dislocation or acute fracture line identified. 

  

  

IMPRESSION: 

Mild generalized demineralization.  Early arthropathy.

## 2018-10-19 ENCOUNTER — HOSPITAL ENCOUNTER (OUTPATIENT)
Dept: HOSPITAL 58 - RAD | Age: 73
Discharge: HOME | End: 2018-10-19
Attending: INTERNAL MEDICINE
Payer: COMMERCIAL

## 2018-10-19 VITALS — BODY MASS INDEX: 29.2 KG/M2

## 2018-10-19 DIAGNOSIS — Z12.31: Primary | ICD-10-CM

## 2018-10-19 PROCEDURE — 77067 SCR MAMMO BI INCL CAD: CPT

## 2018-10-22 NOTE — MAMMO
EXAM:  Bilateral digital screening mammogram (2-D and 3-D) 

  

History:  Screening 

  

Comparison:  Bilateral mammogram 10/26/2017 

  

Findings:  MLO and CC views of bilateral breasts demonstrate scattered fibroglandular breast parenchy
ma.  CAD was reviewed by the radiologist.  Tomosynthesis was performed.  There are no dominant masses
, no suspicious microcalcifications and no architectural distortions. 

  

Impression:  Stable negative mammogram.  Recommend followup routine screening mammography in 1 year. 


  

BIRADS 2

## 2018-10-25 ENCOUNTER — OFFICE VISIT (OUTPATIENT)
Dept: OBSTETRICS AND GYNECOLOGY | Facility: CLINIC | Age: 73
End: 2018-10-25

## 2018-10-25 VITALS
WEIGHT: 169 LBS | SYSTOLIC BLOOD PRESSURE: 126 MMHG | BODY MASS INDEX: 31.1 KG/M2 | HEIGHT: 62 IN | DIASTOLIC BLOOD PRESSURE: 70 MMHG

## 2018-10-25 DIAGNOSIS — L90.0 LICHEN SCLEROSUS: ICD-10-CM

## 2018-10-25 DIAGNOSIS — Z12.31 ENCOUNTER FOR SCREENING MAMMOGRAM FOR BREAST CANCER: ICD-10-CM

## 2018-10-25 DIAGNOSIS — Z78.9 NONSMOKER: ICD-10-CM

## 2018-10-25 DIAGNOSIS — Z01.411 ENCOUNTER FOR GYNECOLOGICAL EXAMINATION WITH ABNORMAL FINDING: Primary | ICD-10-CM

## 2018-10-25 PROCEDURE — G0101 CA SCREEN;PELVIC/BREAST EXAM: HCPCS | Performed by: OBSTETRICS & GYNECOLOGY

## 2018-10-25 RX ORDER — CLOBETASOL PROPIONATE 0.5 MG/G
CREAM TOPICAL 2 TIMES DAILY
Qty: 45 G | Refills: 1 | Status: SHIPPED | OUTPATIENT
Start: 2018-10-25 | End: 2019-10-25 | Stop reason: SDUPTHER

## 2018-11-09 ENCOUNTER — TELEPHONE (OUTPATIENT)
Dept: OBSTETRICS AND GYNECOLOGY | Facility: CLINIC | Age: 73
End: 2018-11-09

## 2018-11-09 NOTE — TELEPHONE ENCOUNTER
Pt said she was given Myrbetriq 25mg samples at her appt 10/25/18 and said it is doing well and would like a script sent into [facility] in [place]

## 2018-11-12 NOTE — TELEPHONE ENCOUNTER
Please confirm pharmacy.  She has [facility] in [place] as her Default.  It is fine to send script with 5 refills once you have confirmed pharmacy; I was doing it but realized her pharmacy was likely a mistake since they are in opposite directions.  Thx

## 2018-11-13 DIAGNOSIS — Z12.31 ENCOUNTER FOR SCREENING MAMMOGRAM FOR BREAST CANCER: ICD-10-CM

## 2018-12-10 DIAGNOSIS — N39.41 URGE INCONTINENCE: Primary | ICD-10-CM

## 2018-12-10 NOTE — TELEPHONE ENCOUNTER
Patient requesting that Myrbetriq rx be sent to [facility] instead of her local pharmacy. Patient states that she did not do this before since she had just started the medication and was unsure if she would like it. Patient is doing well with medication and is up to date on yearly. Medication is sent in per patients request.

## 2018-12-18 DIAGNOSIS — N39.41 URGE INCONTINENCE: ICD-10-CM

## 2019-04-25 ENCOUNTER — HOSPITAL ENCOUNTER (OUTPATIENT)
Dept: HOSPITAL 58 - RAD | Age: 74
Discharge: HOME | End: 2019-04-25
Attending: INTERNAL MEDICINE

## 2019-04-25 VITALS — BODY MASS INDEX: 29.2 KG/M2

## 2019-04-25 DIAGNOSIS — R42: Primary | ICD-10-CM

## 2019-04-25 NOTE — US
EXAM:  ULTRASOUND CAROTID DUPLEX, BILATERAL 

  

HISTORY:  Dizziness 

  

FINDINGS:  Gray-scale ultrasound, color Doppler and spectral analysis was performed.  Velocities are 
in meters per second. 

  

 By gray scale and color Doppler imaging, there were regions of heterogeneous plaque formation identi
fied within the carotid bulbs and internal carotid arteries.  These regions of plaque appeared to rem
ain less than 50% vessel diameter. 

  

  

RIGHT: 

External carotid artery peak systolic velocity:  0.84 

Common carotid artery peak systolic velocity/end diastolic velocity:  0.61/0.18 

Internal carotid artery peak systolic velocity:  1.11 

ICA/CCA peak systolic velocity ratio:  1.8 

ICA end diastolic velocity:  0.27 

  

LEFT: 

External carotid artery peak systolic velocity:  0.81 

Common carotid artery peak systolic velocity/end diastolic velocity:  1.1/0.26 

Internal carotid artery peak systolic velocity:  1.2 

ICA/CCA peak systolic velocity ratio:  1.1 

ICA end diastolic velocity:  0.39 

  

The right and left vertebral arteries were antegrade. 

  

  

IMPRESSION: 

1.  By gray scale and color Doppler imaging, there were regions of heterogeneous plaque formation shanelle
ntified within the carotid bulbs and internal carotid arteries.  These regions of plaque appeared to 
remain less than 50% vessel diameter. 

2.  Internal carotid artery peak systolic velocities and ICA/CCA peak systolic velocity ratios indica
te no hemodynamically significant stenosis bilaterally. 

3.  Both vertebral arteries were antegrade.

## 2019-04-25 NOTE — MRI
Examination:  MRI of the brain with and without contrast 04/25/2019 

  

Clinical information:  Dizzy. 

  

Comparison:  None. 

  

  

TECHNIQUE:  Sagittal postcontrast T1, axial pre and postcontrast T1, axial T2, axial FLAIR, coronal T
2 STAR, coronal postcontrast T1 and diffusion weighted imaging was performed. 

  

FINDINGS:  The midline structures and craniocervical junction are unremarkable.  The ventricles are n
ormal in size and configuration.  There is no mass effect, midline shift or extra-axial abnormality. 
 There are mild chronic microvascular ischemic changes within the supratentorial white matter.  No in
fratentorial signal abnormalities seen. 

  

No diffusion-weighted abnormality is present.  Specifically, there is no evidence of an acute infarct
.  There are expected flow voids within the major intracranial arterial vascular structures.  There i
s no MR evidence of intracranial hemorrhage.  No pathologic intracranial contrast enhancement seen.  
There is rightward nasal septal deviation. 

  

Impression: 

1.  No evidence of an acute infarct. 

2.  Mild chronic microvascular ischemic changes within the supratentorial white matter. 

3.  Otherwise, unremarkable MRI brain with and without contrast.

## 2019-10-25 ENCOUNTER — OFFICE VISIT (OUTPATIENT)
Dept: OBSTETRICS AND GYNECOLOGY | Facility: CLINIC | Age: 74
End: 2019-10-25

## 2019-10-25 VITALS
SYSTOLIC BLOOD PRESSURE: 136 MMHG | BODY MASS INDEX: 27.38 KG/M2 | HEIGHT: 61 IN | WEIGHT: 145 LBS | DIASTOLIC BLOOD PRESSURE: 80 MMHG

## 2019-10-25 DIAGNOSIS — Z12.31 ENCOUNTER FOR SCREENING MAMMOGRAM FOR BREAST CANCER: Primary | ICD-10-CM

## 2019-10-25 DIAGNOSIS — N39.41 URGE INCONTINENCE: ICD-10-CM

## 2019-10-25 DIAGNOSIS — L90.0 LICHEN SCLEROSUS: ICD-10-CM

## 2019-10-25 PROCEDURE — 99213 OFFICE O/P EST LOW 20 MIN: CPT | Performed by: OBSTETRICS & GYNECOLOGY

## 2019-10-25 RX ORDER — CLOBETASOL PROPIONATE 0.5 MG/G
CREAM TOPICAL 2 TIMES DAILY
Qty: 45 G | Refills: 1 | Status: SHIPPED | OUTPATIENT
Start: 2019-10-25 | End: 2019-10-28 | Stop reason: SDUPTHER

## 2019-10-28 ENCOUNTER — TELEPHONE (OUTPATIENT)
Dept: OBSTETRICS AND GYNECOLOGY | Facility: CLINIC | Age: 74
End: 2019-10-28

## 2019-10-28 DIAGNOSIS — L90.0 LICHEN SCLEROSUS: ICD-10-CM

## 2019-10-28 RX ORDER — CLOBETASOL PROPIONATE 0.5 MG/G
CREAM TOPICAL 2 TIMES DAILY
Qty: 45 G | Refills: 1 | Status: SHIPPED | OUTPATIENT
Start: 2019-10-28 | End: 2023-02-23 | Stop reason: SDUPTHER

## 2019-12-05 DIAGNOSIS — N39.41 URGE INCONTINENCE: ICD-10-CM

## 2019-12-12 RX ORDER — MIRABEGRON 25 MG/1
TABLET, FILM COATED, EXTENDED RELEASE ORAL
Qty: 90 TABLET | Refills: 4 | Status: SHIPPED | OUTPATIENT
Start: 2019-12-12 | End: 2020-10-27

## 2020-10-27 ENCOUNTER — OFFICE VISIT (OUTPATIENT)
Dept: OBSTETRICS AND GYNECOLOGY | Facility: CLINIC | Age: 75
End: 2020-10-27

## 2020-10-27 VITALS
BODY MASS INDEX: 27.79 KG/M2 | DIASTOLIC BLOOD PRESSURE: 82 MMHG | WEIGHT: 151 LBS | SYSTOLIC BLOOD PRESSURE: 152 MMHG | HEIGHT: 62 IN

## 2020-10-27 DIAGNOSIS — Z78.0 MENOPAUSE: Primary | ICD-10-CM

## 2020-10-27 DIAGNOSIS — Z12.31 ENCOUNTER FOR SCREENING MAMMOGRAM FOR MALIGNANT NEOPLASM OF BREAST: ICD-10-CM

## 2020-10-27 DIAGNOSIS — Z78.9 NONSMOKER: ICD-10-CM

## 2020-10-27 DIAGNOSIS — N39.41 URGE INCONTINENCE: ICD-10-CM

## 2020-10-27 DIAGNOSIS — N39.41 URGE INCONTINENCE OF URINE: ICD-10-CM

## 2020-10-27 PROCEDURE — G0101 CA SCREEN;PELVIC/BREAST EXAM: HCPCS | Performed by: OBSTETRICS & GYNECOLOGY

## 2020-10-27 NOTE — PROGRESS NOTES
Subjective   Chief Complaint   Patient presents with   • Annual Exam     pt here today for annual exam. pt voices needing refill on myrbetriq. pt voices no concerns.      Keren Andrew is a 75 y.o. year old No obstetric history on file. menopausal female presenting to be seen for her annual exam.  The patient is status-post hysterectomy  Hot flashes and night sweats are not a significant problem.  Keren reports that despite her myrbetriq, she has started having more leakage, mostly dribbling on the way to the bathroom, for the past 2-3 months.  Patient had an episode of syncope on May 4th - she had a two day hospital admission and it was determined that her dose of atenolol needed to be reduced.  Patient had an extensive cardiac work-up at that time, and reports that it was all normal.  She had a UTI about a month ago.    She is not sexually active, due to pain    Patient reports regular self breast exams: no  She exercises regularly: yes.  1.5 miles every day  She has noticed changes in height: yes.    No Additional Complaints Reported    The following portions of the patient's history were reviewed and updated as appropriate:problem list, current medications, allergies, past family history, past medical history, past social history and past surgical history.  Social History    Tobacco Use      Smoking status: Never Smoker      Smokeless tobacco: Never Used    Review of Systems   Constitutional: Negative for activity change and unexpected weight change.   HENT: Negative for congestion.    Eyes: Positive for visual disturbance (wears glasses).   Respiratory: Negative for shortness of breath.    Cardiovascular: Negative for chest pain.   Gastrointestinal: Positive for constipation (chronic). Negative for abdominal pain, blood in stool and diarrhea.   Endocrine: Negative for cold intolerance and heat intolerance.   Genitourinary: Positive for difficulty urinating (just feels like flow is slower than it used to be).  "Negative for pelvic pain, vaginal bleeding, vaginal discharge and vaginal pain (no irritation as long as she uses temovate cream regularly).   Musculoskeletal: Negative for arthralgias, back pain, neck pain and neck stiffness.   Skin: Negative for rash.   Neurological: Positive for syncope (none since May 2020). Negative for dizziness and headaches.   Psychiatric/Behavioral: Negative for dysphoric mood and sleep disturbance. The patient is not nervous/anxious.          Objective   /82   Ht 157.5 cm (62\")   Wt 68.5 kg (151 lb)   BMI 27.62 kg/m²   Physical Exam  Vitals signs and nursing note reviewed. Exam conducted with a chaperone present.   Constitutional:       General: She is not in acute distress.     Appearance: She is well-developed.   HENT:      Head: Normocephalic and atraumatic.   Neck:      Musculoskeletal: Normal range of motion and neck supple.      Thyroid: No thyromegaly.   Cardiovascular:      Rate and Rhythm: Normal rate and regular rhythm.      Heart sounds: No murmur.   Pulmonary:      Effort: Pulmonary effort is normal.      Breath sounds: Normal breath sounds.   Chest:      Breasts:         Right: No inverted nipple or mass.         Left: No inverted nipple or mass.   Abdominal:      General: There is no distension.      Palpations: Abdomen is soft.      Tenderness: There is no abdominal tenderness.   Genitourinary:     General: Normal vulva.      Exam position: Lithotomy position.      Labia:         Right: No tenderness or lesion.         Left: No tenderness or lesion.       Urethra: No prolapse.      Vagina: Normal. No vaginal discharge or bleeding.      Uterus: Absent.       Adnexa:         Right: No tenderness or fullness.          Left: No tenderness or fullness.        Rectum: No external hemorrhoid or internal hemorrhoid. Normal anal tone.      Comments: Patient s/p hysterectomy:  Uterus and cervix surgically absent.  Vaginal cuff unremarkable.  Labia normal, no lesions noted.  " Urethral meatus unremarkable, no prolapse of mucosa.  Anus/perineum normal  Musculoskeletal: Normal range of motion.   Skin:     General: Skin is warm and dry.   Neurological:      Mental Status: She is alert and oriented to person, place, and time.   Psychiatric:         Behavior: Behavior normal.         Judgment: Judgment normal.            Assessment & Plan    Diagnoses and all orders for this visit:    1. Menopause (Primary): Doing well; exam unremarkable.  S/p hysterectomy; no pelvic organ prolapse.  No longer sexually active.  Patient reports regular exercise.  Monthly SBE was encouraged; mammogram and DEXA ordered.  Routine screening labs with PCP.  Overall, patient reports that she is feeling well.  -     DEXA Bone Density Axial; Future  -     Mammo Screening Bilateral With CAD; Future    2. Encounter for screening mammogram for malignant neoplasm of breast   -     Mammo Screening Bilateral With CAD; Future    3. Urge incontinence of urine: Patient had been doing well on 25 mg dose of Myrbetriq, but has been having more urge incontinence the past 2-3 months.  Checking for possible infection, and also sending higher dose of medication.  -     Urinalysis With Microscopic If Indicated (No Culture) - Urine, Clean Catch  -     Urine Culture - Urine, Urine, Clean Catch    4. Urge incontinence  -     Mirabegron ER (MYRBETRIQ) 50 MG tablet sustained-release 24 hour 24 hr tablet; Take 25 mg by mouth Daily.  Dispense: 90 tablet; Refill: 2    5. BMI 27.0-27.9,adult: exercises daily    6. Nonsmoker        This note was electronically signed.    Kelly Milian MD  10/27/2020  11:20 CDT

## 2020-10-29 LAB
APPEARANCE UR: CLEAR
BACTERIA UR CULT: NO GROWTH
BACTERIA UR CULT: NORMAL
BILIRUB UR QL STRIP: NEGATIVE
COLOR UR: YELLOW
GLUCOSE UR QL: NEGATIVE
HGB UR QL STRIP: NEGATIVE
KETONES UR QL STRIP: NEGATIVE
LEUKOCYTE ESTERASE UR QL STRIP: NEGATIVE
NITRITE UR QL STRIP: NEGATIVE
PH UR STRIP: 7.5 [PH] (ref 5–8)
PROT UR QL STRIP: NEGATIVE
SP GR UR: 1.01 (ref 1–1.03)
UROBILINOGEN UR STRIP-MCNC: NORMAL MG/DL

## 2020-11-09 ENCOUNTER — HOSPITAL ENCOUNTER (OUTPATIENT)
Dept: BONE DENSITY | Facility: HOSPITAL | Age: 75
Discharge: HOME | End: 2020-11-09

## 2020-11-09 ENCOUNTER — HOSPITAL ENCOUNTER (OUTPATIENT)
Dept: MAMMOGRAPHY | Facility: HOSPITAL | Age: 75
Discharge: HOME | End: 2020-11-09

## 2020-11-09 DIAGNOSIS — Z78.0 MENOPAUSE: ICD-10-CM

## 2020-11-09 DIAGNOSIS — Z12.31 ENCOUNTER FOR SCREENING MAMMOGRAM FOR MALIGNANT NEOPLASM OF BREAST: ICD-10-CM

## 2020-11-09 PROCEDURE — 77080 DXA BONE DENSITY AXIAL: CPT

## 2020-11-09 PROCEDURE — 77067 SCR MAMMO BI INCL CAD: CPT

## 2020-11-09 PROCEDURE — 77063 BREAST TOMOSYNTHESIS BI: CPT

## 2020-11-12 NOTE — PROGRESS NOTES
Please let patient know that she has osteopenia and that I recommend Ca++, vitamin D, and weight-bearing exercise.  We will plan to repeat DEXA scan in 2022.  Thx

## 2020-11-24 ENCOUNTER — TELEPHONE (OUTPATIENT)
Dept: OBSTETRICS AND GYNECOLOGY | Facility: CLINIC | Age: 75
End: 2020-11-24

## 2020-11-24 NOTE — TELEPHONE ENCOUNTER
"Pt increased Myrbetriq dose to 50mg 10/27/2020 and states she \"cannot handle that much, it is causing severe dry mouth.\" Pt wishes to decrease back to 25mg. Do you want a new RX or okay for pt to break in half?  "

## 2021-01-21 NOTE — TELEPHONE ENCOUNTER
Patient calls stating that Myrbetriq should have been sent to [facility] instead of local pharmacy.

## 2021-02-25 ENCOUNTER — TELEPHONE (OUTPATIENT)
Dept: OTOLARYNGOLOGY | Facility: CLINIC | Age: 76
End: 2021-02-25

## 2021-03-22 ENCOUNTER — OFFICE VISIT (OUTPATIENT)
Dept: OTOLARYNGOLOGY | Facility: CLINIC | Age: 76
End: 2021-03-22

## 2021-03-22 VITALS
BODY MASS INDEX: 27.79 KG/M2 | WEIGHT: 151 LBS | DIASTOLIC BLOOD PRESSURE: 78 MMHG | TEMPERATURE: 98 F | RESPIRATION RATE: 20 BRPM | SYSTOLIC BLOOD PRESSURE: 134 MMHG | HEIGHT: 62 IN | HEART RATE: 82 BPM

## 2021-03-22 DIAGNOSIS — Z79.02 ANTIPLATELET OR ANTITHROMBOTIC LONG-TERM USE: ICD-10-CM

## 2021-03-22 DIAGNOSIS — D37.04: Primary | ICD-10-CM

## 2021-03-22 PROCEDURE — 99203 OFFICE O/P NEW LOW 30 MIN: CPT | Performed by: OTOLARYNGOLOGY

## 2021-03-22 NOTE — PROGRESS NOTES
PRIMARY CARE PROVIDER: Francisco Barrios MD  REFERRING PROVIDER: No ref. provider found    Chief Complaint   Patient presents with   • Skin Lesion     lower lip lesion       Subjective   History of Present Illness:  Keren Andrew is a  75 y.o. female who presents for consultation regarding a lesion of the right lower wet vermilion (lip).  The lesion has the following characteristics:    Location: Right lower lip  Quality: Raised lesion  Severity: Moderate  Duration: Approximately 6 months  Timing: constant  Modifying Factors: None  Associated Signs & Symptoms: None    Her concern arises mostly because she had a large portion of her lower eyelid removed by Dr. Herndon with subsequent reconstruction for what sounds a basal cell carcinoma.    Review of Systems:  Review of Systems   Constitutional: Negative for chills, fatigue, fever and unexpected weight change.   HENT: Negative for facial swelling.    Respiratory: Negative for cough, chest tightness and shortness of breath.    Cardiovascular: Negative for chest pain.   Musculoskeletal: Negative for neck pain.   Skin: Negative for color change.   Neurological: Negative for facial asymmetry.   Hematological: Negative for adenopathy. Does not bruise/bleed easily.       Past History:  Past Medical History:   Diagnosis Date   • Constipation    • Gout    • Hyperlipidemia    • Hypertension    • Neoplasm of uncertain behavior     lower lip   • Plantar fasciitis      Past Surgical History:   Procedure Laterality Date   • BLADDER SUSPENSION     • EYE SURGERY Left     basal cell carcinoma    • GANGLION CYST EXCISION     • HYSTERECTOMY      Abdominal hysterectomy bilateral salpingectomy-pt voices she only has 1 ovary but doesnt know which one     Family History   Problem Relation Age of Onset   • Diabetes Father    • Cancer Father    • Prostate cancer Father    • Diabetes Brother    • Diabetes Maternal Grandmother    • Breast cancer Neg Hx      Social History     Tobacco Use   •  Smoking status: Never Smoker   • Smokeless tobacco: Never Used   Substance Use Topics   • Alcohol use: Not Currently   • Drug use: Never     Allergies:  Sulfa antibiotics    Current Outpatient Medications:   •  Aspirin Buf,CaCarb-MgCarb-MgO, 81 MG tablet, Take 81 mg by mouth., Disp: , Rfl:   •  atenolol (TENORMIN) 25 MG tablet, 25 mg. 4 times weekly, Disp: , Rfl:   •  calcium carbonate (OS-SAMANTA) 600 MG tablet, Take  by mouth., Disp: , Rfl:   •  clobetasol (TEMOVATE) 0.05 % cream, Apply  topically to the appropriate area as directed 2 (Two) Times a Day., Disp: 45 g, Rfl: 1  •  DICLOFENAC PO, Take  by mouth., Disp: , Rfl:   •  indomethacin (INDOCIN) 50 MG capsule, Take 50 mg by mouth., Disp: , Rfl:   •  meclizine 25 MG chewable tablet chewable tablet, Chew 25 mg., Disp: , Rfl:   •  Mirabegron ER (Myrbetriq) 25 MG tablet sustained-release 24 hour 24 hr tablet, Take 1 tablet by mouth Daily., Disp: 90 tablet, Rfl: 2  •  Multiple Vitamins-Minerals (MULTIVITAMIN AND MINERALS) liquid liquid, Take  by mouth Daily., Disp: , Rfl:   •  senna-docusate (PERICOLACE) 8.6-50 MG per tablet, Take  by mouth., Disp: , Rfl:   •  simvastatin (ZOCOR) 20 MG tablet, Take 20 mg by mouth., Disp: , Rfl:   •  triamterene-hydrochlorothiazide (DYAZIDE) 37.5-25 MG per capsule, Take  by mouth., Disp: , Rfl:     Objective     Vital Signs:  Temp:  [98 °F (36.7 °C)] 98 °F (36.7 °C)  Heart Rate:  [82] 82  Resp:  [20] 20  BP: (134)/(78) 134/78    Physical Exam:  Physical Exam  Vitals and nursing note reviewed.   Constitutional:       General: She is not in acute distress.     Appearance: She is well-developed. She is not diaphoretic.   HENT:      Head: Normocephalic and atraumatic.      Right Ear: External ear normal.      Left Ear: External ear normal.      Nose: Nose normal.      Mouth/Throat:     Eyes:      General: No scleral icterus.        Right eye: No discharge.         Left eye: No discharge.      Conjunctiva/sclera: Conjunctivae normal.       Pupils: Pupils are equal, round, and reactive to light.   Neck:      Thyroid: No thyromegaly.      Vascular: No JVD.      Trachea: No tracheal deviation.   Pulmonary:      Effort: Pulmonary effort is normal.      Breath sounds: No stridor.   Musculoskeletal:         General: No deformity. Normal range of motion.      Cervical back: Normal range of motion and neck supple.   Lymphadenopathy:      Cervical: No cervical adenopathy.   Skin:     General: Skin is warm and dry.      Coloration: Skin is not pale.      Findings: No erythema or rash.   Neurological:      Mental Status: She is alert and oriented to person, place, and time.      Cranial Nerves: No cranial nerve deficit.      Coordination: Coordination normal.   Psychiatric:         Speech: Speech normal.         Behavior: Behavior normal. Behavior is cooperative.         Thought Content: Thought content normal.         Judgment: Judgment normal.         Operative plan:  Excision of minor salivary neoplasm of uncertain behavior.    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 Antiplatelet or antithrombotic long-term use        Plan   Plan:  I have recommended excisional biopsy of the lesion with permanent section analysis.  Should this come back as a malignancy, she may require further excision.    Discussion of lesion. Discussed risks, benefits, alternatives, and possible complications of excision of the lesion with reconstruction utilizing local tissue rearrangement, full-thickness skin grafting, or local interpolated flaps. Risks include, but are not limited too: bleeding, infection, hematoma, recurrence, need for additional procedures, flap failure, cosmetic deformity. Patient understands risks and would like to proceed with surgery.     My findings and recommendations were discussed and questions were answered.     Christian Montiel MD  03/22/21  15:44 CDT

## 2021-03-23 ENCOUNTER — TRANSCRIBE ORDERS (OUTPATIENT)
Dept: LAB | Facility: HOSPITAL | Age: 76
End: 2021-03-23

## 2021-03-23 DIAGNOSIS — Z01.818 PRE-OP TESTING: Primary | ICD-10-CM

## 2021-03-25 ENCOUNTER — LAB (OUTPATIENT)
Dept: LAB | Facility: HOSPITAL | Age: 76
End: 2021-03-25

## 2021-03-25 LAB — SARS-COV-2 ORF1AB RESP QL NAA+PROBE: NOT DETECTED

## 2021-03-25 PROCEDURE — U0004 COV-19 TEST NON-CDC HGH THRU: HCPCS | Performed by: OTOLARYNGOLOGY

## 2021-03-25 PROCEDURE — C9803 HOPD COVID-19 SPEC COLLECT: HCPCS | Performed by: OTOLARYNGOLOGY

## 2021-03-25 PROCEDURE — U0005 INFEC AGEN DETEC AMPLI PROBE: HCPCS | Performed by: OTOLARYNGOLOGY

## 2021-03-29 ENCOUNTER — PROCEDURE VISIT (OUTPATIENT)
Dept: OTOLARYNGOLOGY | Facility: CLINIC | Age: 76
End: 2021-03-29

## 2021-03-29 VITALS — HEART RATE: 68 BPM | TEMPERATURE: 96.9 F | DIASTOLIC BLOOD PRESSURE: 92 MMHG | SYSTOLIC BLOOD PRESSURE: 151 MMHG

## 2021-03-29 DIAGNOSIS — D37.04: Primary | ICD-10-CM

## 2021-03-29 PROCEDURE — 88304 TISSUE EXAM BY PATHOLOGIST: CPT | Performed by: OTOLARYNGOLOGY

## 2021-03-29 PROCEDURE — 42405 BIOPSY OF SALIVARY GLAND: CPT | Performed by: OTOLARYNGOLOGY

## 2021-03-29 NOTE — PROGRESS NOTES
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  This palpated like a minor salivary gland.    OPERATIVE FINDINGS: The mass measured 7 mm and was submucosal.    OPERATIVE DETAILS: After patient verification consent was obtained, the lesion was marked.  The patient was handed a mirror where we confirmed the location and verified the consent.  The mucosa was cleansed with alcohol, and infiltrated with 1% lidocaine with 1-100,000 epinephrine directly over the lesion, and in the distribution of the right mental nerve.    After approximately 15 minutes, the patient's sensation was tested.  Anesthesia was deemed appropriate.  A 15 blade was used to make a fusiform incision overlying the mass, and curved iris scissors were used to dissect the mass from the surrounding tissue.  Hemostasis was obtained with bipolar cautery set at 20.  I then closed the area with a buried 5-0 Vicryl suture followed by running, locking 4-0 chromic.      DISPOSITION:  The procedures were completed without complication and tolerated well.  The patient was released in the company of herself to return home in satisfactory condition.  A follow-up appointment has been  scheduled, routine post-op medications prescribed (if required), and post-op instructions were given to the responsible party.           Christian Montiel MD, FACS  Board Certified Facial Plastic and Reconstructive Surgery  Board Certified Otolaryngology -- Head and Neck   Electronically signed by Christian Montiel MD, 03/29/21, 9:35 AM CDT.

## 2021-03-29 NOTE — PATIENT INSTRUCTIONS
CONTACT INFORMATION:  The main office phone number is [phone]. For emergencies after hours and on weekends, this number will convert over to our answering service and the on call provider will answer. Please try to keep non emergent phone calls/ questions to office hours 9am-5pm Monday through Friday.     Pet[employer]  As an alternative, you can sign up and use the Epic MyChart system for more direct and quicker access for non emergent questions/ problems.  [facility] Pet[employer] allows you to send messages to your doctor, view your test results, renew your prescriptions, schedule appointments, and more. To sign up, go to [employer] and click on the Sign Up Now link in the New User? box. Enter your [employer] Activation Code exactly as it appears below along with the last four digits of your Social Security Number and your Date of Birth () to complete the sign-up process. If you do not sign up before the expiration date, you must request a new code.    [employer] Activation Code: [email or web address]  Expires: 2021  3:59 PM    If you have questions, you can email [email or web address] or call [phone] to talk to our [employer] staff. Remember, [employer] is NOT to be used for urgent needs. For medical emergencies, dial 911.

## 2021-04-03 LAB
CYTO UR: NORMAL
LAB AP CASE REPORT: NORMAL
LAB AP CLINICAL INFORMATION: NORMAL
PATH REPORT.FINAL DX SPEC: NORMAL
PATH REPORT.GROSS SPEC: NORMAL

## 2021-04-12 ENCOUNTER — OFFICE VISIT (OUTPATIENT)
Dept: OTOLARYNGOLOGY | Facility: CLINIC | Age: 76
End: 2021-04-12

## 2021-04-12 DIAGNOSIS — Z48.89 SUTURE CHECK: Primary | ICD-10-CM

## 2021-04-12 PROCEDURE — 99024 POSTOP FOLLOW-UP VISIT: CPT | Performed by: OTOLARYNGOLOGY

## 2021-04-12 NOTE — PROGRESS NOTES
Pt here for suture check.  No s/s of infection noted.  Pt c/o of numbness and tenderness to lip.  Dr. Montiel spoke with pt regarding her concerns.

## 2021-07-14 ENCOUNTER — OFFICE VISIT (OUTPATIENT)
Dept: OTOLARYNGOLOGY | Facility: CLINIC | Age: 76
End: 2021-07-14

## 2021-07-14 VITALS
WEIGHT: 151 LBS | TEMPERATURE: 98 F | DIASTOLIC BLOOD PRESSURE: 74 MMHG | BODY MASS INDEX: 27.79 KG/M2 | RESPIRATION RATE: 20 BRPM | HEIGHT: 62 IN | SYSTOLIC BLOOD PRESSURE: 135 MMHG | HEART RATE: 65 BPM

## 2021-07-14 DIAGNOSIS — R68.2 DRY MOUTH: ICD-10-CM

## 2021-07-14 DIAGNOSIS — K13.0 MUCOUS RETENTION CYST OF LIP: Primary | ICD-10-CM

## 2021-07-14 PROCEDURE — 99213 OFFICE O/P EST LOW 20 MIN: CPT | Performed by: OTOLARYNGOLOGY

## 2021-07-14 NOTE — PATIENT INSTRUCTIONS
CONTACT INFORMATION:  The main office phone number is [phone]. For emergencies after hours and on weekends, this number will convert over to our answering service and the on call provider will answer. Please try to keep non emergent phone calls/ questions to office hours 9am-5pm Monday through Friday.     Idea[employer]  As an alternative, you can sign up and use the Epic MyChart system for more direct and quicker access for non emergent questions/ problems.  [facility] Idea[employer]ffer allows you to send messages to your doctor, view your test results, renew your prescriptions, schedule appointments, and more. To sign up, go to [employer] and click on the Sign Up Now link in the New User? box. Enter your [employer] Activation Code exactly as it appears below along with the last four digits of your Social Security Number and your Date of Birth () to complete the sign-up process. If you do not sign up before the expiration date, you must request a new code.    Ide[employer]ffer Activation Code: [email or web address]  Expires: 2021  9:16 AM    If you have questions, you can email [email or web address] or call [phone] to talk to our [employer] staff. Remember, [employer] is NOT to be used for urgent needs. For medical emergencies, dial 911.

## 2021-07-14 NOTE — PROGRESS NOTES
PRIMARY CARE PROVIDER: Francisco Barrios MD  REFERRING PROVIDER: No ref. provider found    Chief Complaint   Patient presents with   • Follow-up     lower lip       Subjective   History of Present Illness:  Keren Andrew is a  75 y.o. female who presents for follow-up regarding her lower lip.  She underwent excision of an enlarged, irritated minor salivary gland on March 29, 2021.  Since the surgery, she has done well.  She does report numbness has persisted medial to the incision on the lower lip since the surgery.  Over the past few weeks, she has noted some odd sensations in this area consistent with nerve tingling.    Back prior to May 2020, she was having issues with dry mouth.  She is actually admitted the hospital for what sounds like a presyncopal episode.  She was diagnosed with a urinary tract infection, diverticulitis, and dehydration.  Since then, she has been focusing on hydration, and her dry mouth is nearly resolved.    Review of Systems:  Review of Systems   Constitutional: Negative for chills, fatigue, fever and unexpected weight change.   HENT: Negative for facial swelling.    Respiratory: Negative for cough, chest tightness and shortness of breath.    Cardiovascular: Negative for chest pain.   Musculoskeletal: Negative for neck pain.   Skin: Negative for color change.   Neurological: Negative for facial asymmetry.   Hematological: Negative for adenopathy. Does not bruise/bleed easily.       Past History:  Past Medical History:   Diagnosis Date   • Constipation    • Gout    • Hyperlipidemia    • Hypertension    • Neoplasm of uncertain behavior     lower lip   • Plantar fasciitis      Past Surgical History:   Procedure Laterality Date   • BLADDER SUSPENSION     • EYE SURGERY Left     basal cell carcinoma    • GANGLION CYST EXCISION     • HYSTERECTOMY      Abdominal hysterectomy bilateral salpingectomy-pt voices she only has 1 ovary but doesnt know which one     Family History   Problem Relation Age  of Onset   • Diabetes Father    • Cancer Father    • Prostate cancer Father    • Diabetes Brother    • Diabetes Maternal Grandmother    • Breast cancer Neg Hx      Social History     Tobacco Use   • Smoking status: Never Smoker   • Smokeless tobacco: Never Used   Substance Use Topics   • Alcohol use: Not Currently   • Drug use: Never     Allergies:  Sulfa antibiotics    Current Outpatient Medications:   •  Aspirin Buf,CaCarb-MgCarb-MgO, 81 MG tablet, Take 81 mg by mouth., Disp: , Rfl:   •  atenolol (TENORMIN) 25 MG tablet, 25 mg. 4 times weekly, Disp: , Rfl:   •  calcium carbonate (OS-SAMANTA) 600 MG tablet, Take  by mouth., Disp: , Rfl:   •  clobetasol (TEMOVATE) 0.05 % cream, Apply  topically to the appropriate area as directed 2 (Two) Times a Day., Disp: 45 g, Rfl: 1  •  DICLOFENAC PO, Take  by mouth., Disp: , Rfl:   •  indomethacin (INDOCIN) 50 MG capsule, Take 50 mg by mouth., Disp: , Rfl:   •  meclizine 25 MG chewable tablet chewable tablet, Chew 25 mg., Disp: , Rfl:   •  Mirabegron ER (Myrbetriq) 25 MG tablet sustained-release 24 hour 24 hr tablet, Take 1 tablet by mouth Daily., Disp: 90 tablet, Rfl: 2  •  Multiple Vitamins-Minerals (MULTIVITAMIN AND MINERALS) liquid liquid, Take  by mouth Daily., Disp: , Rfl:   •  senna-docusate (PERICOLACE) 8.6-50 MG per tablet, Take  by mouth., Disp: , Rfl:   •  simvastatin (ZOCOR) 20 MG tablet, Take 20 mg by mouth., Disp: , Rfl:   •  triamterene-hydrochlorothiazide (DYAZIDE) 37.5-25 MG per capsule, Take  by mouth., Disp: , Rfl:       Objective     Vital Signs:  Temp:  [98 °F (36.7 °C)] 98 °F (36.7 °C)  Heart Rate:  [65] 65  Resp:  [20] 20  BP: (135)/(74) 135/74    Physical Exam:  Physical Exam  Vitals and nursing note reviewed.   Constitutional:       General: She is not in acute distress.     Appearance: She is well-developed. She is not diaphoretic.   HENT:      Head: Normocephalic and atraumatic.      Right Ear: External ear normal.      Left Ear: External ear normal.       Nose: Nose normal.      Mouth/Throat:     Eyes:      General: No scleral icterus.        Right eye: No discharge.         Left eye: No discharge.      Conjunctiva/sclera: Conjunctivae normal.      Pupils: Pupils are equal, round, and reactive to light.   Neck:      Thyroid: No thyromegaly.      Vascular: No JVD.      Trachea: No tracheal deviation.   Pulmonary:      Effort: Pulmonary effort is normal.      Breath sounds: No stridor.   Musculoskeletal:         General: No deformity. Normal range of motion.      Cervical back: Normal range of motion and neck supple.   Lymphadenopathy:      Cervical: No cervical adenopathy.   Skin:     General: Skin is warm and dry.      Coloration: Skin is not pale.      Findings: No erythema or rash.   Neurological:      Mental Status: She is alert and oriented to person, place, and time.      Cranial Nerves: No cranial nerve deficit.      Coordination: Coordination normal.   Psychiatric:         Speech: Speech normal.         Behavior: Behavior normal. Behavior is cooperative.         Thought Content: Thought content normal.         Judgment: Judgment normal.         Results Review:   Her pathology was reviewed and is consistent with a mucous retention cyst with chronic inflammation:      Assessment   Assessment:  1. Mucous retention cyst of lip    2. Dry mouth        Plan   Plan:  I would expect the numbness to resolve within the next year.  She is already experiencing paresthesias, which is consistent with nerve regeneration.  I discussed the possibility of Sjogren's syndrome, which I think is unlikely.  She did have an episode of dehydration and dry mouth, that has resolved when she focuses on her hydration.  Follow-up as needed.      My findings and recommendations were discussed and questions were answered.     Christian Montiel MD  07/14/21  09:16 CDT

## 2021-10-15 RX ORDER — MIRABEGRON 25 MG/1
TABLET, FILM COATED, EXTENDED RELEASE ORAL
Qty: 90 TABLET | Refills: 0 | Status: SHIPPED | OUTPATIENT
Start: 2021-10-15 | End: 2021-10-28 | Stop reason: SDUPTHER

## 2021-10-28 ENCOUNTER — OFFICE VISIT (OUTPATIENT)
Dept: OBSTETRICS AND GYNECOLOGY | Facility: CLINIC | Age: 76
End: 2021-10-28

## 2021-10-28 VITALS
SYSTOLIC BLOOD PRESSURE: 128 MMHG | HEIGHT: 62 IN | DIASTOLIC BLOOD PRESSURE: 74 MMHG | WEIGHT: 156 LBS | BODY MASS INDEX: 28.71 KG/M2

## 2021-10-28 DIAGNOSIS — Z78.9 NONSMOKER: ICD-10-CM

## 2021-10-28 DIAGNOSIS — Z12.31 ENCOUNTER FOR SCREENING MAMMOGRAM FOR MALIGNANT NEOPLASM OF BREAST: Primary | ICD-10-CM

## 2021-10-28 DIAGNOSIS — Z01.419 ENCOUNTER FOR GYNECOLOGICAL EXAMINATION WITHOUT ABNORMAL FINDING: ICD-10-CM

## 2021-10-28 DIAGNOSIS — Z12.11 ENCOUNTER FOR SCREENING COLONOSCOPY: ICD-10-CM

## 2021-10-28 DIAGNOSIS — Z78.0 MENOPAUSE: ICD-10-CM

## 2021-10-28 DIAGNOSIS — N39.46 MIXED INCONTINENCE: ICD-10-CM

## 2021-10-28 PROCEDURE — G0101 CA SCREEN;PELVIC/BREAST EXAM: HCPCS | Performed by: OBSTETRICS & GYNECOLOGY

## 2021-11-03 ENCOUNTER — OFFICE VISIT (OUTPATIENT)
Dept: OBSTETRICS AND GYNECOLOGY | Facility: CLINIC | Age: 76
End: 2021-11-03

## 2021-11-03 ENCOUNTER — TELEPHONE (OUTPATIENT)
Dept: OBSTETRICS AND GYNECOLOGY | Facility: CLINIC | Age: 76
End: 2021-11-03

## 2021-11-03 VITALS
BODY MASS INDEX: 28.34 KG/M2 | DIASTOLIC BLOOD PRESSURE: 80 MMHG | WEIGHT: 154 LBS | HEIGHT: 62 IN | SYSTOLIC BLOOD PRESSURE: 134 MMHG

## 2021-11-03 DIAGNOSIS — N39.46 MIXED INCONTINENCE: ICD-10-CM

## 2021-11-03 DIAGNOSIS — R30.0 DYSURIA: Primary | ICD-10-CM

## 2021-11-03 DIAGNOSIS — N30.00 ACUTE CYSTITIS WITHOUT HEMATURIA: ICD-10-CM

## 2021-11-03 LAB
BILIRUB BLD-MCNC: NEGATIVE MG/DL
CLARITY, POC: ABNORMAL
COLOR UR: YELLOW
GLUCOSE UR STRIP-MCNC: NEGATIVE MG/DL
KETONES UR QL: NEGATIVE
LEUKOCYTE EST, POC: ABNORMAL
NITRITE UR-MCNC: NEGATIVE MG/ML
PH UR: 8 [PH] (ref 5–8)
PROT UR STRIP-MCNC: NEGATIVE MG/DL
RBC # UR STRIP: ABNORMAL /UL
SP GR UR: 1.01 (ref 1–1.03)
UROBILINOGEN UR QL: NORMAL

## 2021-11-03 PROCEDURE — 81002 URINALYSIS NONAUTO W/O SCOPE: CPT | Performed by: OBSTETRICS & GYNECOLOGY

## 2021-11-03 PROCEDURE — 99213 OFFICE O/P EST LOW 20 MIN: CPT | Performed by: OBSTETRICS & GYNECOLOGY

## 2021-11-03 RX ORDER — NITROFURANTOIN 25; 75 MG/1; MG/1
100 CAPSULE ORAL 2 TIMES DAILY
Qty: 14 CAPSULE | Refills: 0 | Status: SHIPPED | OUTPATIENT
Start: 2021-11-03 | End: 2022-01-28

## 2021-11-03 NOTE — TELEPHONE ENCOUNTER
Pt called with c/o UTI symptoms   Pt is having pain and burning and requesting an appt today.  Pt added to Dr Milian schedule for 1 pm today.   Pt voices understanding.

## 2021-11-09 ENCOUNTER — OFFICE VISIT (OUTPATIENT)
Dept: GASTROENTEROLOGY | Facility: CLINIC | Age: 76
End: 2021-11-09

## 2021-11-09 VITALS
BODY MASS INDEX: 28.34 KG/M2 | HEART RATE: 69 BPM | HEIGHT: 62 IN | SYSTOLIC BLOOD PRESSURE: 130 MMHG | OXYGEN SATURATION: 99 % | WEIGHT: 154 LBS | DIASTOLIC BLOOD PRESSURE: 78 MMHG | TEMPERATURE: 96.7 F

## 2021-11-09 DIAGNOSIS — Z12.11 ENCOUNTER FOR SCREENING FOR MALIGNANT NEOPLASM OF COLON: Primary | ICD-10-CM

## 2021-11-09 PROCEDURE — S0260 H&P FOR SURGERY: HCPCS | Performed by: NURSE PRACTITIONER

## 2021-11-09 NOTE — PROGRESS NOTES
Chief Complaint   Patient presents with   • Colonoscopy     had colon 2010 in ill. she thinks  needs colon       PCP: Francisco Barrios MD  REFER: Kelly Milian MD    Subjective     HPI    Keren Andrew is a 76 y.o. female who presents to office for preventative maintenance.  There is not  a personal history of colon polyps.  There is not a history of colon cancer.  She does not have complaints of nausea/vomiting, change in bowels, weight loss, no BRBPR, no melena.  There is not a family history of colon cancer in first degree relative.  There is not a family history of colon polyps in first degree relative.  Her last colonoscopy-apx 2010-per pt .  Bowels do not move on regular basis without use of daily stool softener and enema once per week.  This is not new and has been occurring for many years.  She has never utilized miralax.       Past Medical History:   Diagnosis Date   • Constipation    • Gout    • Hyperlipidemia    • Hypertension    • Neoplasm of uncertain behavior     lower lip   • Plantar fasciitis      Past Surgical History:   Procedure Laterality Date   • BACK SURGERY     • BLADDER SUSPENSION     • EYE SURGERY Left     basal cell carcinoma    • GANGLION CYST EXCISION     • HEMORROIDECTOMY     • HYSTERECTOMY      Abdominal hysterectomy bilateral salpingectomy-pt voices she only has 1 ovary but doesnt know which one     Outpatient Medications Marked as Taking for the 11/9/21 encounter (Office Visit) with Ozzy White APRN   Medication Sig Dispense Refill   • Aspirin Buf,CaCarb-MgCarb-MgO, 81 MG tablet Take 81 mg by mouth.     • atenolol (TENORMIN) 25 MG tablet 25 mg. 4 times weekly     • calcium carbonate (OS-SAMANTA) 600 MG tablet Take  by mouth.     • clobetasol (TEMOVATE) 0.05 % cream Apply  topically to the appropriate area as directed 2 (Two) Times a Day. 45 g 1   • DICLOFENAC PO Take  by mouth.     • indomethacin (INDOCIN) 50 MG capsule Take 50 mg by mouth.     • meclizine 25 MG chewable  tablet chewable tablet Chew 25 mg.     • Mirabegron ER (Myrbetriq) 25 MG tablet sustained-release 24 hour 24 hr tablet Take 1 tablet by mouth Daily. 90 tablet 3   • Multiple Vitamins-Minerals (MULTIVITAMIN AND MINERALS) liquid liquid Take  by mouth Daily.     • senna-docusate (PERICOLACE) 8.6-50 MG per tablet Take  by mouth.     • simvastatin (ZOCOR) 20 MG tablet Take 20 mg by mouth.     • triamterene-hydrochlorothiazide (DYAZIDE) 37.5-25 MG per capsule Take  by mouth.       Allergies   Allergen Reactions   • Ciprofibrate Nausea And Vomiting   • Sulfa Antibiotics Rash     Social History     Socioeconomic History   • Marital status:    Tobacco Use   • Smoking status: Never Smoker   • Smokeless tobacco: Never Used   Substance and Sexual Activity   • Alcohol use: Yes     Comment: rare   • Drug use: Never   • Sexual activity: Not Currently     Partners: Male     Review of Systems   Constitutional: Negative for unexpected weight change.   Respiratory: Negative for shortness of breath.    Cardiovascular: Negative for chest pain.   Gastrointestinal: Negative for abdominal pain and anal bleeding.     Objective   Vitals:    11/09/21 1440   BP: 130/78   Pulse: 69   Temp: 96.7 °F (35.9 °C)   SpO2: 99%     Physical Exam  Constitutional:       Appearance: Normal appearance. She is well-developed.   Eyes:      General: No scleral icterus.  Cardiovascular:      Rate and Rhythm: Regular rhythm.      Heart sounds: Normal heart sounds. No murmur heard.      Pulmonary:      Effort: Pulmonary effort is normal. No accessory muscle usage.      Breath sounds: Normal breath sounds.   Abdominal:      General: Bowel sounds are normal. There is no distension.      Palpations: Abdomen is soft. There is no mass.      Tenderness: There is no abdominal tenderness. There is no guarding or rebound.   Skin:     General: Skin is warm and dry.      Coloration: Skin is not jaundiced.   Neurological:      Mental Status: She is alert.    Psychiatric:         Behavior: Behavior is cooperative.       Imaging Results (Most Recent)     None        Body mass index is 28.17 kg/m².    Assessment/Plan   Diagnoses and all orders for this visit:    1. Encounter for screening for malignant neoplasm of colon (Primary)  -     Case Request; Standing  -     Case Request      COLONOSCOPY WITH ANESTHESIA (N/A)    Multi day prep with miralax prep    Start miralax 2-3 times per day, adjust as needed,    Post colonoscopy   Recommend daily use of Miralax, adjust as needed  Encouraged addition of dietary fiber, increasing daily water consumption, as well daily physical activity   If bowels do  Not improve asked patient to call office, can consider linzess v amitiza    Advised pt to stop use of NSAIDs, Fish Oil, and MV 5 days prior to procedure, per Dr Joyce protocol.  Tylenol based products are ok to take.  Pt verbalized understanding.     All risks, benefits, alternatives, and indications of colonoscopy procedure have been discussed with the patient. Risks to include perforation of the colon requiring possible surgery or colostomy, risk of bleeding from biopsies or removal of colon tissue, possibility of missing a colon polyp or cancer, or adverse drug reaction.  Benefits to include the diagnosis and management of disease of the colon and rectum. Alternatives to include barium enema, radiographic evaluation, lab testing or no intervention. She verbalizes understanding and agrees.         Ozzy White, APRN  11/09/21        There are no Patient Instructions on file for this visit.

## 2021-11-10 PROBLEM — Z12.11 ENCOUNTER FOR SCREENING FOR MALIGNANT NEOPLASM OF COLON: Status: ACTIVE | Noted: 2021-11-10

## 2021-11-19 ENCOUNTER — TREATMENT (OUTPATIENT)
Dept: PHYSICAL THERAPY | Facility: CLINIC | Age: 76
End: 2021-11-19

## 2021-11-19 DIAGNOSIS — R15.9 INCONTINENCE OF FECES, UNSPECIFIED FECAL INCONTINENCE TYPE: ICD-10-CM

## 2021-11-19 DIAGNOSIS — N39.41 URGENCY INCONTINENCE: Primary | ICD-10-CM

## 2021-11-19 DIAGNOSIS — N39.46 MIXED STRESS AND URGE URINARY INCONTINENCE: ICD-10-CM

## 2021-11-19 DIAGNOSIS — R39.15 URINARY URGENCY: ICD-10-CM

## 2021-11-19 PROCEDURE — 97110 THERAPEUTIC EXERCISES: CPT | Performed by: PHYSICAL THERAPIST

## 2021-11-19 PROCEDURE — 97162 PT EVAL MOD COMPLEX 30 MIN: CPT | Performed by: PHYSICAL THERAPIST

## 2021-11-19 NOTE — PROGRESS NOTES
"                                                        Physical Therapy Initial Evaluation and Plan of Care    Patient: Keren Andrew              : 1945  Today's Date: 2021  Referring practitioner: Kelly Milian MD  Date of Initial Visit: 2021  Patient seen for 1 sessions    Visit Diagnoses:    ICD-10-CM ICD-9-CM   1. Urgency incontinence  N39.41 788.31   2. Urinary urgency  R39.15 788.63   3. Mixed stress and urge urinary incontinence  N39.46 788.33   4. Incontinence of feces, unspecified fecal incontinence type  R15.9 787.60     Past Medical History:   Diagnosis Date   • Constipation    • Gout    • Hyperlipidemia    • Hypertension    • Neoplasm of uncertain behavior     lower lip   • Plantar fasciitis      Past Surgical History:   Procedure Laterality Date   • BACK SURGERY     • BLADDER SUSPENSION     • EYE SURGERY Left     basal cell carcinoma    • GANGLION CYST EXCISION     • HEMORROIDECTOMY     • HYSTERECTOMY      Abdominal hysterectomy bilateral salpingectomy-pt voices she only has 1 ovary but doesnt know which one       SUBJECTIVE     Subjective Evaluation    History of Present Illness    Subjective comment: She had dribbling in the late 80s and had her bladder \"tied\" up. She reports NIKI since high school. She has fecal and gas incontinence the last 3 months, started clear lax and improving everything.   Patient Occupation: walks 1.5 miles daily Pain  Location: back pain last week with UTI         Aggravating factors include urination.   Bowel habits include BM's once a day. Difficulties with bowel include leakage, urgency and prior to clear lax difficulty emptying/incomplete emptying  Bladder habits include a daytime frequency of has varied secondary to recent UTI and nighttime frequency of 0-1. Bladder difficulties include leakage, urgency and pad/garment use.    Special concerns: not applicable    Hot tea in the AM, then another mid morning, iced tea for lunch, X 2 iced tea for " dinner. She is drinking more water the last several day.     Outcome Measure: 20.75, 15.75, 41.75       OBJECTIVE     Objective       Therapy Education/Self Care 10303   Details:    Given Home Exercise Program  [employer] HEP (access code [email or web address])   Progress: New   Education provided to:  Patient   Level of understanding Verbalized and Demonstrated   Timed Minutes        Therapeutic Exercises    [id number] Comments   Pillow prop    Breathing     TrA             Timed Minutes 12     Passive Hip ROM    Hip IR  Hip ER   Right 25 Right 38   Left 19 Left 40     Hip Abduction Strength (Glut Med): R 4- L 4-     SLR/TA contraction: slight decrease in activation and slight abdominal bulge  SLS R WFL L WFL    Total Timed Treatment:     12   mins  Total Time of Visit:            50   mins    ASSESSMENT/PLAN        Goals                                                                           Progress Note due by 12/19/21                                                                                          Re-cert due by  2/16/22               STG by: 4 weeks Comments Status Date   Pt will demonstrate proper PF 3+/5 or greater and TA activation in supine in order to progress with more functional strengthening.       Pt will be independent with initial HEP, in order to accelerate progress.        Pt will report being able to make it to the restroom without leaking 50% or greater time.                LTG by: 8 weeks       Pt will be independent with HEP including core, hip and PF strengthening.        Pt will demonstrate 4/5 or greater hip abduction strength.         Pt will score 40 or less on the PFDI-20.  20.75, 15.75, 41.75 (78.25/300)     Pt will report 0-1 episodes of UI in a months' time.                               Assessment & Plan     Assessment  Impairments: impaired physical strength and lacks appropriate home exercise program  Assessment details: Keren presents with complaints of  Mixed UI, fecal and gas  incontinence. She does have long h/o of pelvic issues, including bladder surgery 30 years ago. Upon assessment today she has decreased hip and core strength placing increased strain on her PF. She also has bowel inconsistencies and drinks very little water. In the last several days, she has improved her water intake and started clear lax per her MD, improving bowel issues already. She is reporting a vaginall bulge over the last couple days and something we will access further in the future for potential POP, which she did not have on her last GYN appointment. I did give her some exercises to go ahead and start working on this from home.  Skilled PT needed to address these deficits and progress towards independent HEP.   Prognosis: good  Functional Limitations: lifting, walking, pulling and pushing  Plan  Therapy options: will be seen for skilled physical therapy services  Planned modality interventions: dry needling and TENS  Planned therapy interventions: abdominal trunk stabilization, manual therapy, motor coordination training, neuromuscular re-education, ADL retraining, soft tissue mobilization, spinal/joint mobilization, strengthening, stretching, therapeutic activities, transfer training, IADL retraining, joint mobilization, home exercise program and functional ROM exercises  Frequency: 1x week  Duration in visits: 12  Duration in weeks: 12  Treatment plan discussed with: patient  Plan details: Initially we will work on PF strengthening, endurance and coordination. We will assess her PF and work on accordingly. We will then progress with core and hip strengthening, progressing HEP to reflect.         SIGNATURE: Iman Burton, PT DPT, License #: [id number]  Electronically Signed on 11/19/2021    Initial Certification  Certification Period: 11/19/2021 through 2/16/2022  I certify that the therapy services are furnished while this patient is under my care.  The services outlined above are required by this  patient, and will be reviewed every 90 days.     PHYSICIAN:   Kelly Milian MD__________________________________DATE: _________    Please sign and return via fax to [phone].   Thank you so much for letting us work with Keren. I appreciate your letting us work with your patients. If you have any questions or concerns, please don't hesitate to contact me.          [address] [id number]  [phone]

## 2021-12-01 ENCOUNTER — TREATMENT (OUTPATIENT)
Dept: PHYSICAL THERAPY | Facility: CLINIC | Age: 76
End: 2021-12-01

## 2021-12-01 ENCOUNTER — HOSPITAL ENCOUNTER (OUTPATIENT)
Dept: MAMMOGRAPHY | Facility: HOSPITAL | Age: 76
Discharge: HOME | End: 2021-12-01
Admitting: OBSTETRICS & GYNECOLOGY

## 2021-12-01 DIAGNOSIS — N39.46 MIXED STRESS AND URGE URINARY INCONTINENCE: ICD-10-CM

## 2021-12-01 DIAGNOSIS — R15.9 INCONTINENCE OF FECES, UNSPECIFIED FECAL INCONTINENCE TYPE: ICD-10-CM

## 2021-12-01 DIAGNOSIS — N39.41 URGENCY INCONTINENCE: Primary | ICD-10-CM

## 2021-12-01 DIAGNOSIS — R39.15 URINARY URGENCY: ICD-10-CM

## 2021-12-01 PROCEDURE — 77067 SCR MAMMO BI INCL CAD: CPT

## 2021-12-01 PROCEDURE — 97110 THERAPEUTIC EXERCISES: CPT | Performed by: PHYSICAL THERAPIST

## 2021-12-01 PROCEDURE — 77063 BREAST TOMOSYNTHESIS BI: CPT

## 2021-12-01 NOTE — PROGRESS NOTES
Physical Therapy Treatment Note    Patient: Keren Andrew                                                                     Visit Date: 2021  :     1945    Referring practitioner:    Kelly Milian MD  Date of Initial Visit:          Type: THERAPY  Noted: 2021    Patient seen for 2 sessions    Visit Diagnoses:    ICD-10-CM ICD-9-CM   1. Urgency incontinence  N39.41 788.31   2. Urinary urgency  R39.15 788.63   3. Mixed stress and urge urinary incontinence  N39.46 788.33   4. Incontinence of feces, unspecified fecal incontinence type  R15.9 787.60     SUBJECTIVE     Subjective The clearlax continues to help her BMs, she still has daily, no gas incontinence. She is still having urge incontinence.     PAIN: 0/10         OBJECTIVE     Objective      Therapeutic Exercises    21135 Comments   Reviewed/updated HEP Reviewed breathing mechanics, hip lifts, etc   Hip adduction with ball + breathing 2 X 10   Clams with green TB  2 X 10   BKFO with green TB  2 X 20                   Timed Minutes 38       Therapy Education/Self Care 96092   Details:    Given Home Exercise Program  [facility] (access code [email or web address])   Progress: Reinforced   Education provided to:  Patient   Level of understanding Verbalized and Demonstrated   Timed Minutes          Total Timed Treatment:     38   mins  Total Time of Visit:             38   mins         ASSESSMENT/PLAN     GOALS  Goals                                                               Progress Note due by 21                                                                                  Re-cert due by  22                STG by: 4 weeks Comments Status Date   Pt will demonstrate proper PF 3+/5 or greater and TA activation in supine in order to progress with more functional strengthening.    ongoing     Pt will be independent with initial HEP, in order to accelerate progress.   mostly  compliant  met  12/1   Pt will report being able to make it to the restroom without leaking 50% or greater time.   still occuring  ongoing 12/1    LTG by: 8 weeks         Pt will be independent with HEP including core, hip and PF strengthening.          Pt will demonstrate 4/5 or greater hip abduction strength.           Pt will score 40 or less on the PFDI-20.  20.75, 15.75, 41.75 (78.25/300)       Pt will report 0-1 episodes of UI in a months' time.                      Assessment/Plan     ASSESSMENT: Today was Keren's first visit since initial evaluation. She has ongoing urinary urge incontinence, hip and core weakness. I did progress her HEP today and we reviewed her current program. She needed some cues with breath work.     PLAN: Biofeedback with her PF next visit. Continue to work on breathing mechanics and progressing HEP.     SIGNATURE: Iman Burton, PT DPT, License #: [id number]  Electronically Signed on 12/1/2021        [address] [place] [address] [place] [address]  [phone]

## 2021-12-09 ENCOUNTER — HOSPITAL ENCOUNTER (OUTPATIENT)
Facility: HOSPITAL | Age: 76
Setting detail: HOSPITAL OUTPATIENT SURGERY
Discharge: HOME | End: 2021-12-09
Attending: INTERNAL MEDICINE | Admitting: INTERNAL MEDICINE

## 2021-12-09 ENCOUNTER — ANESTHESIA (OUTPATIENT)
Dept: GASTROENTEROLOGY | Facility: HOSPITAL | Age: 76
End: 2021-12-09

## 2021-12-09 ENCOUNTER — ANESTHESIA EVENT (OUTPATIENT)
Dept: GASTROENTEROLOGY | Facility: HOSPITAL | Age: 76
End: 2021-12-09

## 2021-12-09 VITALS
TEMPERATURE: 97.1 F | WEIGHT: 154 LBS | BODY MASS INDEX: 28.34 KG/M2 | RESPIRATION RATE: 18 BRPM | OXYGEN SATURATION: 98 % | DIASTOLIC BLOOD PRESSURE: 53 MMHG | SYSTOLIC BLOOD PRESSURE: 140 MMHG | HEART RATE: 59 BPM | HEIGHT: 62 IN

## 2021-12-09 DIAGNOSIS — Z12.11 ENCOUNTER FOR SCREENING FOR MALIGNANT NEOPLASM OF COLON: ICD-10-CM

## 2021-12-09 PROCEDURE — 25010000002 PROPOFOL 10 MG/ML EMULSION: Performed by: NURSE ANESTHETIST, CERTIFIED REGISTERED

## 2021-12-09 PROCEDURE — G0121 COLON CA SCRN NOT HI RSK IND: HCPCS | Performed by: INTERNAL MEDICINE

## 2021-12-09 RX ORDER — LIDOCAINE HYDROCHLORIDE 20 MG/ML
INJECTION, SOLUTION EPIDURAL; INFILTRATION; INTRACAUDAL; PERINEURAL AS NEEDED
Status: DISCONTINUED | OUTPATIENT
Start: 2021-12-09 | End: 2021-12-09 | Stop reason: SURG

## 2021-12-09 RX ORDER — SODIUM CHLORIDE 0.9 % (FLUSH) 0.9 %
10 SYRINGE (ML) INJECTION AS NEEDED
Status: DISCONTINUED | OUTPATIENT
Start: 2021-12-09 | End: 2021-12-09 | Stop reason: HOSPADM

## 2021-12-09 RX ORDER — PROPOFOL 10 MG/ML
VIAL (ML) INTRAVENOUS AS NEEDED
Status: DISCONTINUED | OUTPATIENT
Start: 2021-12-09 | End: 2021-12-09 | Stop reason: SURG

## 2021-12-09 RX ORDER — LIDOCAINE HYDROCHLORIDE 10 MG/ML
0.5 INJECTION, SOLUTION EPIDURAL; INFILTRATION; INTRACAUDAL; PERINEURAL ONCE AS NEEDED
Status: CANCELLED | OUTPATIENT
Start: 2021-12-09

## 2021-12-09 RX ORDER — SODIUM CHLORIDE 9 MG/ML
500 INJECTION, SOLUTION INTRAVENOUS CONTINUOUS PRN
Status: DISCONTINUED | OUTPATIENT
Start: 2021-12-09 | End: 2021-12-09 | Stop reason: HOSPADM

## 2021-12-09 RX ADMIN — SODIUM CHLORIDE 500 ML: 9 INJECTION, SOLUTION INTRAVENOUS at 09:16

## 2021-12-09 RX ADMIN — PROPOFOL 200 MG: 10 INJECTION, EMULSION INTRAVENOUS at 09:58

## 2021-12-09 RX ADMIN — LIDOCAINE HYDROCHLORIDE 50 MG: 20 INJECTION, SOLUTION EPIDURAL; INFILTRATION; INTRACAUDAL; PERINEURAL at 09:58

## 2021-12-09 NOTE — ANESTHESIA PREPROCEDURE EVALUATION
Anesthesia Evaluation     Patient summary reviewed   no history of anesthetic complications:  NPO Solid Status: > 8 hours             Airway   Mallampati: II  Dental      Pulmonary    (+) sleep apnea on CPAP,   Cardiovascular     (+) hypertension, hyperlipidemia,       Neuro/Psych- negative ROS  GI/Hepatic/Renal/Endo - negative ROS     Musculoskeletal     Abdominal    Substance History      OB/GYN          Other                        Anesthesia Plan    ASA 2     MAC       Anesthetic plan, all risks, benefits, and alternatives have been provided, discussed and informed consent has been obtained with: patient.

## 2021-12-09 NOTE — ANESTHESIA POSTPROCEDURE EVALUATION
"Patient: Keren Andrew    Procedure Summary     Date: 12/09/21 Room / Location: [facility] ENDOSCOPY 5 / [facility] END[facility]    Anesthesia Start: 0954 Anesthesia Stop: 1013    Procedure: COLONOSCOPY WITH ANESTHESIA (N/A ) Diagnosis:       Encounter for screening for malignant neoplasm of colon      (Encounter for screening for malignant neoplasm of colon [Z12.11])    Surgeons: Dale Joyce DO Provider: Diogo Joy CRNA    Anesthesia Type: MAC ASA Status: 2          Anesthesia Type: MAC    Vitals  Vitals Value Taken Time   BP     Temp     Pulse 68 12/09/21 1013   Resp     SpO2     Vitals shown include unvalidated device data.        Post Anesthesia Care and Evaluation    Patient location during evaluation: PHASE II  Patient participation: complete - patient participated  Level of consciousness: awake and alert  Pain management: adequate  Airway patency: patent  Anesthetic complications: No anesthetic complications    Cardiovascular status: acceptable  Respiratory status: acceptable  Hydration status: acceptable    Comments: Blood pressure 179/56, pulse 59, temperature 97.1 °F (36.2 °C), temperature source Temporal, resp. rate 16, height 157.5 cm (62\"), weight 69.9 kg (154 lb), SpO2 100 %, not currently breastfeeding.    Pt discharged from PACU based on vijay score >8      "

## 2021-12-10 ENCOUNTER — TREATMENT (OUTPATIENT)
Dept: PHYSICAL THERAPY | Facility: CLINIC | Age: 76
End: 2021-12-10

## 2021-12-10 DIAGNOSIS — R15.9 INCONTINENCE OF FECES, UNSPECIFIED FECAL INCONTINENCE TYPE: ICD-10-CM

## 2021-12-10 DIAGNOSIS — N39.41 URGENCY INCONTINENCE: Primary | ICD-10-CM

## 2021-12-10 DIAGNOSIS — N39.46 MIXED STRESS AND URGE URINARY INCONTINENCE: ICD-10-CM

## 2021-12-10 DIAGNOSIS — R39.15 URINARY URGENCY: ICD-10-CM

## 2021-12-10 PROCEDURE — 97110 THERAPEUTIC EXERCISES: CPT | Performed by: PHYSICAL THERAPIST

## 2021-12-10 PROCEDURE — 97112 NEUROMUSCULAR REEDUCATION: CPT | Performed by: PHYSICAL THERAPIST

## 2021-12-10 NOTE — PROGRESS NOTES
Physical Therapy Treatment Note and 30 Day Progress Note    Patient: Keren Andrew                                                                     Visit Date: 12/10/2021  :     1945    Referring practitioner:    Kelly Milian MD  Date of Initial Visit:          Type: THERAPY  Noted: 2021    Patient seen for 3 sessions    Visit Diagnoses:    ICD-10-CM ICD-9-CM   1. Urgency incontinence  N39.41 788.31   2. Urinary urgency  R39.15 788.63   3. Mixed stress and urge urinary incontinence  N39.46 788.33   4. Incontinence of feces, unspecified fecal incontinence type  R15.9 787.60     SUBJECTIVE     Subjective Still having urgency incontinence. She had colonoscopy yesterday and report increased urgency with increased liquid.     PAIN: 0/10         OBJECTIVE     Objective      Therapeutic Exercises    68013 Comments   Reviewed/updated HEP Reviewed breathing mechanics, hip lifts, etc                               Timed Minutes 10     Neuromuscular Reeducation     05979 Comments   PFM activation with sEMG/biofeedback to external pelvic floor in SL  Cues for isolation/breathing at first. 15-20 mV; holding 10 seconds, quick contractions X 5; needed cues for relaxation (had increased time for relaxation)   PFM activation with sEMG/biofeedback to external pelvic floor in sitting 7-10 mV; holding 6-7 seconds, cues for isolation; slight increased time needed for resting; with TrA X 5               Timed Minutes 25       Therapy Education/Self Care 32200   Details:    Given Home Exercise Program  [employer] [facility] (access code [email or web address])   Progress: Reinforced   Education provided to:  Patient   Level of understanding Verbalized and Demonstrated   Timed Minutes          Total Timed Treatment:     40   mins  Total Time of Visit:             40   mins         ASSESSMENT/PLAN     GOALS  Goals                                           Progress Note due by  1/9/21                                                                Re-cert due by  2/16/22                STG by: 4 weeks Comments Status Date   Pt will demonstrate proper PF 3+/5 or greater and TA activation in supine in order to progress with more functional strengthening.    met  12/10   Pt will be independent with initial HEP, in order to accelerate progress.   mostly compliant  met  12/1   Pt will report being able to make it to the restroom without leaking 50% or greater time.   slightly worse with colonoscopy   ongoing 12/10   LTG by: 8 weeks         Pt will be independent with HEP including core, hip and PF strengthening.   progressing ongoing 12/10   Pt will demonstrate 4/5 or greater hip abduction strength.    progressing with HEP ongoing 12/10   Pt will score 40 or less on the PFDI-20. Did not assess today   20.75, 15.75, 41.75 (78.25/300) ongoing 12/10   Pt will report 0-1 episodes of UI in a months' time.   ongoing ongoing 12/10               Assessment & Plan     Assessment  Impairments: impaired physical strength and lacks appropriate home exercise program  Functional Limitations: lifting, walking, pulling and pushingPrognosis: good    Plan  Therapy options: will be seen for skilled therapy services  Planned modality interventions: dry needling and TENS  Planned therapy interventions: abdominal trunk stabilization, manual therapy, motor coordination training, neuromuscular re-education, ADL retraining, soft tissue mobilization, spinal/joint mobilization, strengthening, stretching, therapeutic activities, transfer training, IADL retraining, joint mobilization, home exercise program and functional ROM exercises  Frequency: 1x week  Duration in visits: 10  Duration in weeks: 10  Treatment plan discussed with: patient         ASSESSMENT: She met another short term goal. She has ongoing urgency incontinence, which worsened with colonoscopy prep. She did show decent PF control and endurance with  biofeedback today. Today was her 3rd treatment session, but progress note performed today since she will be seeing PTA next visit.     PLAN: Continue to work on PF, hip and core strengthening. Progress with HEP as able. Progress towards standing PF activation.     SIGNATURE: Iman Burton, PT DPT, License #: [id number]  Electronically Signed on 12/10/2021        [address] [place] [address] [place]  [place] [address]  [phone]

## 2021-12-13 ENCOUNTER — TELEPHONE (OUTPATIENT)
Dept: GASTROENTEROLOGY | Facility: CLINIC | Age: 76
End: 2021-12-13

## 2021-12-15 ENCOUNTER — TREATMENT (OUTPATIENT)
Dept: PHYSICAL THERAPY | Facility: CLINIC | Age: 76
End: 2021-12-15

## 2021-12-15 DIAGNOSIS — N39.41 URGENCY INCONTINENCE: Primary | ICD-10-CM

## 2021-12-15 DIAGNOSIS — N39.46 MIXED STRESS AND URGE URINARY INCONTINENCE: ICD-10-CM

## 2021-12-15 DIAGNOSIS — R39.15 URINARY URGENCY: ICD-10-CM

## 2021-12-15 DIAGNOSIS — R15.9 INCONTINENCE OF FECES, UNSPECIFIED FECAL INCONTINENCE TYPE: ICD-10-CM

## 2021-12-15 PROCEDURE — 97110 THERAPEUTIC EXERCISES: CPT

## 2021-12-15 NOTE — PROGRESS NOTES
"                                                                Physical Therapy Treatment Note    Patient: Keren Andrew                                                                     Visit Date: 12/15/2021  :     1945    Referring practitioner:    Kelly Milian MD  Date of Initial Visit:          Type: THERAPY  Noted: 2021    Patient seen for 4 sessions    Visit Diagnoses:    ICD-10-CM ICD-9-CM   1. Urgency incontinence  N39.41 788.31   2. Urinary urgency  R39.15 788.63   3. Mixed stress and urge urinary incontinence  N39.46 788.33   4. Incontinence of feces, unspecified fecal incontinence type  R15.9 787.60     SUBJECTIVE     Subjective She has been volunteering today and is tired. Pt reports \"a dribble or 2\" in the last week.If she is nearing her toilet, she has very little time to get onto it before leaking. This has greatly improved since beginning PF PT. Pt admits she has never been able to figure out how to stop her stream of urine, therefore she is unsure if she is jatin her PFM correctly. She has not tried to since beginning therapy. Pt reports her HEP takes a while to perform and is slightly inconvenient.     PAIN: 0/10     OBJECTIVE     Objective      Therapeutic Exercises    16299 Comments   Modified and reviewed HEP    HL isometric SB presses 1x10   HL isometric SB presses w/ exhale  1x10   B SL clamshells 1x10   B SL clamshells w/ exhale 1x10   Sit to stands 1x4   Sit to stands w/ exhale 1x4   Sit to stands w/ PFM contractions  1x4   PFM activations seated on SB 1x10   PPT seated on SB  1x10   Seated march on table 1x10   Seated march on table w/ exhale 1x10   Timed Minutes 38     Therapy Education/Self Care 49291   Details: Encouraged pt to attempt to stop the flow of urine; modified her HEP today (see notes in bold below)   Given Home Exercise Program  [facility] [facility] (access code [email or web address])   Progress: Reinforced   Education provided to:  Patient   Level of understanding " Verbalized and Demonstrated   Timed Minutes    Access Code: [email or web address]  URL: [email or web address]  Date: 12/15/2021  Prepared by: Donna Washington    Exercises  Seated Pelvic Floor Contraction - 1-2 x daily (encouraged to attempt in many positions throughout the day)  Pillow Prop - 1-2 x daily (encouraged her to try w/out and if her symptoms worsen, to begin again)  Hooklying Rib Cage Breathing - 1-2 x daily - 2-3 sets - 3-5 reps (encouraged use of exhale w/ her other hip HEP which would satisfy the need to perform these separately.)  Beginner Bridge - 3 x weekly - 2-3 sets - 10 reps (she is allowed to raise her bottom however high she is comfortable)  Seated Hip Adduction Squeeze with Ball - 3 x weekly - 2-3 sets - 10 reps  Hooklying Single Leg Bent Knee Fallouts with Resistance - 3 x weekly - 2 sets - 20 reps      Total Timed Treatment:     38   mins  Total Time of Visit:             40   mins         ASSESSMENT/PLAN     GOALS  Goals                                           Progress Note due by 1/9/21                                                                Re-cert due by  2/16/22                STG by: 4 weeks Comments Status Date   Pt will demonstrate proper PF 3+/5 or greater and TA activation in supine in order to progress with more functional strengthening.    met  12/10   Pt will be independent with initial HEP, in order to accelerate progress.   mostly compliant  met  12/1   Pt will report being able to make it to the restroom without leaking 50% or greater time.   slightly worse with colonoscopy   ongoing 12/10   LTG by: 8 weeks         Pt will be independent with HEP including core, hip and PF strengthening.   progressing ongoing 12/10   Pt will demonstrate 4/5 or greater hip abduction strength.    progressing with HEP ongoing 12/10   Pt will score 40 or less on the PFDI-20. Did not assess today   20.75, 15.75, 41.75 (78.25/300) ongoing 12/10   Pt will report 0-1 episodes of UI in a months'  "time.   Pt reports 1-2 \"dribbles\" of urine per week.  Progressing 12/15         ASSESSMENT: She continues to report positive progression and reduced leaking. She presented today reporting struggles w/ her HEP, therefore we reviewed it and made proper adjustments. She did well with all hip and core strengthening exercises today but did report coordinating w/ breathing was difficult. Pt also mentioned that it is difficult for her to feel if she is performing PFM activations correctly. She did not really feel much feedback sitting on the SB so she was encouraged to feel with her hand.     PLAN: Consider manual assessment (internal or external) of her ability to contract LA as she is unsure whether she is properly performing her PFM activations. Discuss continued need to skilled PF PT. If not continuing, consider modifying HEP (BKFO to SL clams) and educating pt on how to appropriately progress strengthening. Please clarify next visit if bridging exercise is supposed to be PPT because that is how she understood it when assigned.     SIGNATURE: Donna Washington PTA, License #: [id number]  Electronically Signed on 12/15/2021        [facility] [place] [address]  [phone]  "

## 2022-01-06 DIAGNOSIS — N39.46 MIXED INCONTINENCE: ICD-10-CM

## 2022-01-06 RX ORDER — MIRABEGRON 25 MG/1
TABLET, FILM COATED, EXTENDED RELEASE ORAL
Qty: 90 TABLET | Refills: 3 | OUTPATIENT
Start: 2022-01-06

## 2022-01-28 ENCOUNTER — OFFICE VISIT (OUTPATIENT)
Dept: OBSTETRICS AND GYNECOLOGY | Facility: CLINIC | Age: 77
End: 2022-01-28

## 2022-01-28 VITALS
BODY MASS INDEX: 28.52 KG/M2 | SYSTOLIC BLOOD PRESSURE: 136 MMHG | WEIGHT: 155 LBS | DIASTOLIC BLOOD PRESSURE: 80 MMHG | HEIGHT: 62 IN

## 2022-01-28 DIAGNOSIS — R15.9 INCONTINENCE OF FECES, UNSPECIFIED FECAL INCONTINENCE TYPE: ICD-10-CM

## 2022-01-28 DIAGNOSIS — Z78.0 MENOPAUSE: ICD-10-CM

## 2022-01-28 DIAGNOSIS — N39.46 MIXED INCONTINENCE: ICD-10-CM

## 2022-01-28 DIAGNOSIS — Z78.9 NONSMOKER: ICD-10-CM

## 2022-01-28 DIAGNOSIS — N39.41 URGE INCONTINENCE OF URINE: Primary | ICD-10-CM

## 2022-01-28 PROCEDURE — 99213 OFFICE O/P EST LOW 20 MIN: CPT | Performed by: OBSTETRICS & GYNECOLOGY

## 2022-01-28 RX ORDER — MELATONIN
1000 DAILY
COMMUNITY

## 2022-01-28 NOTE — PROGRESS NOTES
"Subjective   Chief Complaint   Patient presents with   • Urinary Incontinence     pt here today for follow up on urge incontinence. pt has been seeing Ayana for pelvic floor therapy and voices that she thinks it is helping some. pt voices no other concerns.      Keren Andrew is a 76 y.o. year old No obstetric history on file..  No LMP recorded. Patient has had a hysterectomy.  She presents to be seen for follow-up of urge incontinence.  Patient taking 25 mg Myrbetriq and has also been doing physical therapy with Iman.   Patient with only minimal leakage recently; and says that when it happens, it is only a drop.     The following portions of the patient's history were reviewed and updated as appropriate:current medications and allergies    Social History    Tobacco Use      Smoking status: Never Smoker      Smokeless tobacco: Never Used    Review of Systems   Constitutional: Negative for activity change and unexpected weight change.   Respiratory: Negative for shortness of breath.    Cardiovascular: Negative for chest pain.   Gastrointestinal: Positive for constipation (managing with ClearLax). Negative for abdominal pain.   Genitourinary: Negative for difficulty urinating, enuresis, frequency and urgency.        Urgency/frequency/urge incontinence all significantly better         Objective   /80   Ht 157.5 cm (62\")   Wt 70.3 kg (155 lb)   BMI 28.35 kg/m²     Physical Exam  Vitals and nursing note reviewed.   Constitutional:       General: She is not in acute distress.     Appearance: She is well-developed.   HENT:      Head: Normocephalic and atraumatic.   Neck:      Thyroid: No thyromegaly.   Pulmonary:      Effort: Pulmonary effort is normal.   Musculoskeletal:         General: Normal range of motion.      Cervical back: Normal range of motion.   Skin:     General: Skin is warm and dry.   Neurological:      Mental Status: She is alert and oriented to person, place, and time.   Psychiatric:         " Behavior: Behavior normal.         Judgment: Judgment normal.         Lab Review   No data reviewed    Imaging   No data reviewed     Assessment & Plan    Diagnoses and all orders for this visit:    1. Urge incontinence of urine (Primary): Patient still with very rare leakage associated with strong urges.  Patient could not tolerate the larger 50 mg dose of Myrbetriq, but does very well on the 25 mg dose.  She also feels like pelvic floor physical therapy has helped significantly.  Patient very pleased with how much her symptoms have improved does not want to make any changes in her therapy    2. Incontinence of feces, unspecified fecal incontinence type: Fecal incontinence is also improved since she has started using ClearLax on a regular basis for constipation.    3. Menopause    4. Nonsmoker    5. Mixed incontinence  -     Mirabegron ER (Myrbetriq) 25 MG tablet sustained-release 24 hour 24 hr tablet; Take 1 tablet by mouth Daily.  Dispense: 90 tablet; Refill: 3    Patient welcome to return to the office anytime she desires; without any changes in her status, patient will be seen once yearly.  Patient to continue pelvic floor therapy exercises    This note was electronically signed.    Kelly Milian MD  January 28, 2022  11:21 CST    Total time spent today with Keren  was 10-19 minutes (level 2).  Greater than 50% of the time was spent coordinating care, answering her questions and counseling regarding pathophysiology of her presenting problem along with plans for any diagnositc work-up and treatment.

## 2022-03-25 ENCOUNTER — OFFICE VISIT (OUTPATIENT)
Dept: OBSTETRICS AND GYNECOLOGY | Facility: CLINIC | Age: 77
End: 2022-03-25

## 2022-03-25 VITALS
HEIGHT: 62 IN | WEIGHT: 153 LBS | BODY MASS INDEX: 28.16 KG/M2 | DIASTOLIC BLOOD PRESSURE: 82 MMHG | SYSTOLIC BLOOD PRESSURE: 132 MMHG

## 2022-03-25 DIAGNOSIS — N89.8 VAGINAL LESION: Primary | ICD-10-CM

## 2022-03-25 PROCEDURE — 99213 OFFICE O/P EST LOW 20 MIN: CPT | Performed by: NURSE PRACTITIONER

## 2022-04-17 NOTE — PROGRESS NOTES
"    Subjective   Keren Andrew is a 76 y.o. female  YOB: 1945      Chief Complaint   Patient presents with   • Lesion     After self examination, pt has noticed a \"black dot on the prolapsed vagina\" that is growing in size.        Patient here for evaluation of perceived lesion vagina.      The following portions of the patient's history were reviewed and updated as appropriate: allergies, current medications, past family history, past medical history, past social history, past surgical history, and problem list.    Allergies   Allergen Reactions   • Ciprofibrate Nausea And Vomiting   • Sulfa Antibiotics Rash       Past Medical History:   Diagnosis Date   • Arthritis    • Cancer (HCC)     skin ca   • Constipation    • Gout    • Hyperlipidemia    • Hypertension    • Neoplasm of uncertain behavior     lower lip   • Plantar fasciitis    • Sleep apnea     cpap at hs       Family History   Problem Relation Age of Onset   • Diabetes Father    • Cancer Father    • Prostate cancer Father    • Diabetes Brother    • Diabetes Maternal Grandmother    • Breast cancer Niece    • Colon cancer Neg Hx    • Colon polyps Neg Hx    • Esophageal cancer Neg Hx        Social History     Socioeconomic History   • Marital status:    Tobacco Use   • Smoking status: Never Smoker   • Smokeless tobacco: Never Used   Vaping Use   • Vaping Use: Never used   Substance and Sexual Activity   • Alcohol use: Yes     Comment: rare   • Drug use: Never   • Sexual activity: Never         Current Outpatient Medications:   •  Aspirin Buf,CaCarb-MgCarb-MgO, 81 MG tablet, Take 81 mg by mouth., Disp: , Rfl:   •  atenolol (TENORMIN) 25 MG tablet, 25 mg. 4 times weekly, Disp: , Rfl:   •  calcium carbonate (OS-SAMANTA) 600 MG tablet, Take  by mouth., Disp: , Rfl:   •  cholecalciferol (VITAMIN D3) 25 MCG (1000 UT) tablet, Take 1,000 Units by mouth Daily., Disp: , Rfl:   •  clobetasol (TEMOVATE) 0.05 % cream, Apply  topically to the appropriate " area as directed 2 (Two) Times a Day., Disp: 45 g, Rfl: 1  •  indomethacin (INDOCIN) 50 MG capsule, Take 50 mg by mouth., Disp: , Rfl:   •  meclizine 25 MG chewable tablet chewable tablet, Chew 25 mg., Disp: , Rfl:   •  Mirabegron ER (Myrbetriq) 25 MG tablet sustained-release 24 hour 24 hr tablet, Take 1 tablet by mouth Daily., Disp: 90 tablet, Rfl: 3  •  Multiple Vitamins-Minerals (MULTIVITAMIN AND MINERALS) liquid liquid, Take  by mouth Daily., Disp: , Rfl:   •  senna-docusate (PERICOLACE) 8.6-50 MG per tablet, Take  by mouth., Disp: , Rfl:   •  simvastatin (ZOCOR) 20 MG tablet, Take 20 mg by mouth., Disp: , Rfl:   •  triamterene-hydrochlorothiazide (DYAZIDE) 37.5-25 MG per capsule, Take  by mouth., Disp: , Rfl:     No LMP recorded. Patient has had a hysterectomy.    Sexual History:         Could not be calculated    Past Surgical History:   Procedure Laterality Date   • BACK SURGERY     • BLADDER SUSPENSION     • CERVICAL CONE BIOPSY     • COLONOSCOPY N/A 12/09/2021    Procedure: COLONOSCOPY WITH ANESTHESIA;  Surgeon: Dale Joyce DO;  Location: [facility] ENDOSCOPY;  Service: Gastroenterology;  Laterality: N/A;  pre screen  post diverticulosis  Francisco Barrios MD   • EYE SURGERY Left     basal cell carcinoma    • GANGLION CYST EXCISION     • HEMORROIDECTOMY     • HYSTERECTOMY      Abdominal hysterectomy bilateral salpingectomy-pt voices she only has 1 ovary but doesnt know which one       Review of Systems   Constitutional: Negative for activity change, appetite change, chills, diaphoresis, fatigue, fever and unexpected weight change.   HENT: Negative for congestion, dental problem, drooling, ear discharge, ear pain, facial swelling, hearing loss, mouth sores, nosebleeds, postnasal drip, rhinorrhea, sinus pressure, sinus pain, sneezing, sore throat, tinnitus, trouble swallowing and voice change.    Eyes: Negative for photophobia, pain, discharge, redness, itching and visual disturbance.   Respiratory:  "Negative for apnea, cough, choking, chest tightness, shortness of breath, wheezing and stridor.    Cardiovascular: Negative for chest pain, palpitations and leg swelling.   Gastrointestinal: Negative for abdominal distention, abdominal pain, anal bleeding, blood in stool, constipation, diarrhea, nausea, rectal pain and vomiting.   Endocrine: Negative for cold intolerance, heat intolerance, polydipsia, polyphagia and polyuria.   Genitourinary: Positive for genital sores (\"black dot\"). Negative for decreased urine volume, difficulty urinating, dyspareunia, dysuria, enuresis, flank pain, frequency, hematuria, menstrual problem, pelvic pain, urgency, vaginal bleeding, vaginal discharge and vaginal pain.   Musculoskeletal: Negative for arthralgias, back pain, gait problem, joint swelling, myalgias, neck pain and neck stiffness.   Skin: Negative for color change, pallor, rash and wound.   Allergic/Immunologic: Negative for environmental allergies, food allergies and immunocompromised state.   Neurological: Negative for dizziness, tremors, seizures, syncope, facial asymmetry, speech difficulty, weakness, light-headedness, numbness and headaches.   Hematological: Negative for adenopathy. Does not bruise/bleed easily.   Psychiatric/Behavioral: Negative for agitation, behavioral problems, confusion, decreased concentration, dysphoric mood, hallucinations, self-injury, sleep disturbance and suicidal ideas. The patient is not nervous/anxious and is not hyperactive.        Objective   Physical Exam  Vitals and nursing note reviewed.   Constitutional:       Appearance: She is well-developed.   HENT:      Head: Normocephalic.   Eyes:      Pupils: Pupils are equal, round, and reactive to light.   Cardiovascular:      Rate and Rhythm: Normal rate and regular rhythm.   Pulmonary:      Effort: Pulmonary effort is normal.      Breath sounds: Normal breath sounds.   Abdominal:      Palpations: Abdomen is soft. " "  Genitourinary:      Musculoskeletal:         General: Normal range of motion.      Cervical back: Normal range of motion.   Skin:     General: Skin is warm and dry.   Neurological:      Mental Status: She is alert and oriented to person, place, and time.   Psychiatric:         Behavior: Behavior normal.           Vitals:    03/25/22 1055   BP: 132/82   BP Location: Left arm   Patient Position: Sitting   Weight: 69.4 kg (153 lb)   Height: 157.5 cm (62\")       Diagnoses and all orders for this visit:    1. Vaginal lesion (Primary)  Comments:  Patient reports a \"blacked out\" on \" prolapsed vagina\".  On exam there is no lesion noted and area that patient perceives is a black dot is rugae.  RTO for yearly or sooner as needed.          Patient's Body mass index is 27.98 kg/m². indicating that she is overweight (BMI 25-29.9). Patient's (Body mass index is 27.98 kg/m².) indicates that they are overweight with health conditions that include hypertension . Weight is unchanged. BMI is is above average; BMI management plan is completed. We discussed portion control and increasing exercise. .             Non-Smoker    MyChart Instructions Given       "

## 2022-07-14 NOTE — PROGRESS NOTES
"Subjective    Ms. Andrew is 76 y.o. female    Chief Complaint: Recurrent UTI and mixed incontinence    History of Present Illness     76-year-old female new patient referred for recurring urinary tract infections as well as mixed incontinence.  Patient recently treated with a round of Macrobid on 6/27/2022 for symptoms of dysuria and increased urine frequency and urgency and urinalysis positive for large leukocytes and trace blood although negative for nitrites.  No urine culture available for me to review other than a negative urine culture from 7/2022.  Patient reports being told in the past of urine cultures positive for E. coli bacteria although no lab reports at this time.  At present time patient is asymptomatic.  Patient's urinalysis at this time is clear no signs of infection.    Patient reports vaginal itching and dryness as well as a history of bladder prolapse for which patient states had a bladder operation approximately 30 to 35 years ago.  Patient states that her bladder appears to have reprolapsed as patient states at times that she is having to \"push something back up\".  Patient denies any feelings of incomplete emptying although is wondering if this may be contributing to her recurring urinary tract infections.    Patient reports a history of mixed incontinence for which patient has previously seen outpatient physical therapy for pelvic floor physical therapy.  Patient states that she completed approximately 3 months of therapy and was doing well although as of recent has not been doing the exercises at home and has noticed the incontinence has worsened.  Patient is currently on Myrbetriq 25 mg as well for the urge incontinence and overactive bladder symptoms.  Patient states that she has previously tried to increase the dose to 50 mg although was unable to tolerate due to an extremely dry mouth.    The following portions of the patient's history were reviewed and updated as appropriate: allergies, " current medications, past family history, past medical history, past social history, past surgical history and problem list.    Review of Systems   Constitutional: Negative for chills and fatigue.   Gastrointestinal: Negative for nausea and vomiting.   Genitourinary: Positive for dysuria, frequency, pelvic pain and urgency. Negative for decreased urine volume, difficulty urinating, flank pain and hematuria.         Current Outpatient Medications:   •  Aspirin Buf,CaCarb-MgCarb-MgO, 81 MG tablet, Take 81 mg by mouth., Disp: , Rfl:   •  atenolol (TENORMIN) 25 MG tablet, 25 mg. daily, Disp: , Rfl:   •  calcium carbonate (OS-SAMANTA) 600 MG tablet, Take  by mouth., Disp: , Rfl:   •  cholecalciferol (VITAMIN D3) 25 MCG (1000 UT) tablet, Take 1,000 Units by mouth Daily., Disp: , Rfl:   •  clobetasol (TEMOVATE) 0.05 % cream, Apply  topically to the appropriate area as directed 2 (Two) Times a Day., Disp: 45 g, Rfl: 1  •  indomethacin (INDOCIN) 50 MG capsule, Take 50 mg by mouth., Disp: , Rfl:   •  meclizine 25 MG chewable tablet chewable tablet, Chew 25 mg., Disp: , Rfl:   •  Mirabegron ER (Myrbetriq) 25 MG tablet sustained-release 24 hour 24 hr tablet, Take 1 tablet by mouth Daily., Disp: 90 tablet, Rfl: 3  •  Multiple Vitamins-Minerals (MULTIVITAMIN AND MINERALS) liquid liquid, Take  by mouth Daily., Disp: , Rfl:   •  senna-docusate (PERICOLACE) 8.6-50 MG per tablet, Take  by mouth., Disp: , Rfl:   •  simvastatin (ZOCOR) 20 MG tablet, Take 20 mg by mouth., Disp: , Rfl:   •  triamterene-hydrochlorothiazide (DYAZIDE) 37.5-25 MG per capsule, Take  by mouth., Disp: , Rfl:   •  [START ON 7/21/2022] estradiol (ESTRACE) 0.1 MG/GM vaginal cream, Insert 2 g into the vagina 2 (Two) Times a Week., Disp: 42.5 g, Rfl: 12  •  nitrofurantoin (MACRODANTIN) 50 MG capsule, Take 1 capsule by mouth Every Night., Disp: 90 capsule, Rfl: 0  •  phenazopyridine (PYRIDIUM) 100 MG tablet, Take 1 tablet by mouth 3 (Three) Times a Day As Needed for  "Bladder Spasms., Disp: 20 tablet, Rfl: 0    Past Medical History:   Diagnosis Date   • Arthritis    • Cancer (HCC)     skin ca   • Constipation    • Gout    • Hyperlipidemia    • Hypertension    • Neoplasm of uncertain behavior     lower lip   • Plantar fasciitis    • Sleep apnea     cpap at hs       Past Surgical History:   Procedure Laterality Date   • BACK SURGERY     • BLADDER SUSPENSION     • CERVICAL CONE BIOPSY     • COLONOSCOPY N/A 12/09/2021    Procedure: COLONOSCOPY WITH ANESTHESIA;  Surgeon: Dale Joyce DO;  Location: [facility] ENDOSCOPY;  Service: Gastroenterology;  Laterality: N/A;  pre screen  post diverticulosis  Francisco Barrios MD   • EYE SURGERY Left     basal cell carcinoma    • GANGLION CYST EXCISION     • HEMORROIDECTOMY     • HYSTERECTOMY      Abdominal hysterectomy bilateral salpingectomy-pt voices she only has 1 ovary but doesnt know which one       Social History     Socioeconomic History   • Marital status:    Tobacco Use   • Smoking status: Never Smoker   • Smokeless tobacco: Never Used   Vaping Use   • Vaping Use: Never used   Substance and Sexual Activity   • Alcohol use: Yes     Comment: rare   • Drug use: Never   • Sexual activity: Never       Family History   Problem Relation Age of Onset   • Diabetes Father    • Cancer Father    • Prostate cancer Father    • Diabetes Brother    • Diabetes Maternal Grandmother    • Breast cancer Niece    • Colon cancer Neg Hx    • Colon polyps Neg Hx    • Esophageal cancer Neg Hx        Objective    Temp 97.3 °F (36.3 °C)   Ht 157.5 cm (62\")   Wt 71.2 kg (157 lb)   BMI 28.72 kg/m²     Physical Exam  Nursing note reviewed.   Constitutional:       General: She is not in acute distress.     Appearance: Normal appearance. She is not ill-appearing.   HENT:      Nose: No congestion.   Abdominal:      Tenderness: There is no right CVA tenderness or left CVA tenderness.      Hernia: No hernia is present.   Skin:     General: Skin is warm and " dry.   Neurological:      Mental Status: She is alert and oriented to person, place, and time.   Psychiatric:         Mood and Affect: Mood normal.         Behavior: Behavior normal.             Results for orders placed or performed in visit on 07/19/22   POC Urinalysis Dipstick, Multipro    Specimen: Urine   Result Value Ref Range    Color Yellow Yellow, Straw, Dark Yellow, Kelly    Clarity, UA Clear Clear    Glucose, UA Negative Negative mg/dL    Bilirubin Negative Negative    Ketones, UA Negative Negative    Specific Gravity  1.010 1.005 - 1.030    Blood, UA Trace (A) Negative    pH, Urine 7.0 5.0 - 8.0    Protein, POC Negative Negative mg/dL    Urobilinogen, UA Normal Normal    Nitrite, UA Negative Negative    Leukocytes Negative Negative     Assessment and Plan  Bladder Scan interpretation  Estimation of residual urine via abdominal ultrasound  Residual Urine: 10ml  Indication: UTI  Position: Supine  Examination: Incremental scanning of the suprapubic area using 3 MHz transducer using copious amounts of acoustic gel.   Findings: An anechoic area was demonstrated which represented the bladder, with measurement of residual urine as noted. I inspected this myself. In that the residual urine was stable or insignificant, no treatment will be necessary at this time.     Diagnoses and all orders for this visit:    1. Recurrent UTI (Primary)  -     POC Urinalysis Dipstick, Multipro  -     estradiol (ESTRACE) 0.1 MG/GM vaginal cream; Insert 2 g into the vagina 2 (Two) Times a Week.  Dispense: 42.5 g; Refill: 12  -     nitrofurantoin (MACRODANTIN) 50 MG capsule; Take 1 capsule by mouth Every Night.  Dispense: 90 capsule; Refill: 0    2. Dysuria  -     phenazopyridine (PYRIDIUM) 100 MG tablet; Take 1 tablet by mouth 3 (Three) Times a Day As Needed for Bladder Spasms.  Dispense: 20 tablet; Refill: 0    3. Atrophic vaginitis    4. Mixed incontinence    76-year-old female new patient referred for recurring urinary tract  "infections as well as mixed incontinence.      Patient reports vaginal itching and dryness as well as a history of bladder prolapse for which patient states had a bladder operation approximately 30 to 35 years ago.  Patient states that her bladder appears to have reprolapsed as patient states at times that she is having to \"push something back up\".  Patient denies any feelings of incomplete emptying although is wondering if this may be contributing to her recurring urinary tract infections.At present time patient is asymptomatic.  Patient's urinalysis at this time is clear no signs of infection.    Patient reports a history of mixed incontinence for which patient has previously seen outpatient physical therapy for pelvic floor physical therapy.      Patient is currently on Myrbetriq 25 mg as well for the urge incontinence and overactive bladder symptoms.  Patient states that she has previously tried to increase the dose to 50 mg although was unable to tolerate due to an extremely dry mouth.  At this time patient would like to stay on the Myrbetriq 25 mg daily.    At this time will start patient on 3-month course of nitrofurantoin antibiotic prophylaxis for history of recurrent urinary tract infections.  I will also send patient in a prescription for Pyridium as needed for dysuria.    Due to patient's report of vaginal itching and dryness, recurrent UTIs, bladder prolapse, and dysuria will start patient on twice weekly vaginal estrogen cream.    Patient was encouraged to follow-up with her OB/GYN regarding the history of prior bladder sling surgery and the possible reprolapse.    Patient to follow-up in 3 months  "

## 2022-07-19 ENCOUNTER — OFFICE VISIT (OUTPATIENT)
Dept: UROLOGY | Facility: CLINIC | Age: 77
End: 2022-07-19

## 2022-07-19 ENCOUNTER — PATIENT ROUNDING (BHMG ONLY) (OUTPATIENT)
Dept: UROLOGY | Facility: CLINIC | Age: 77
End: 2022-07-19

## 2022-07-19 VITALS — TEMPERATURE: 97.3 F | BODY MASS INDEX: 28.89 KG/M2 | HEIGHT: 62 IN | WEIGHT: 157 LBS

## 2022-07-19 DIAGNOSIS — N95.2 ATROPHIC VAGINITIS: ICD-10-CM

## 2022-07-19 DIAGNOSIS — R30.0 DYSURIA: ICD-10-CM

## 2022-07-19 DIAGNOSIS — N39.0 RECURRENT UTI: Primary | ICD-10-CM

## 2022-07-19 DIAGNOSIS — N39.46 MIXED INCONTINENCE: ICD-10-CM

## 2022-07-19 LAB
BILIRUB BLD-MCNC: NEGATIVE MG/DL
CLARITY, POC: CLEAR
COLOR UR: YELLOW
GLUCOSE UR STRIP-MCNC: NEGATIVE MG/DL
KETONES UR QL: NEGATIVE
LEUKOCYTE EST, POC: NEGATIVE
NITRITE UR-MCNC: NEGATIVE MG/ML
PH UR: 7 [PH] (ref 5–8)
PROT UR STRIP-MCNC: NEGATIVE MG/DL
RBC # UR STRIP: ABNORMAL /UL
SP GR UR: 1.01 (ref 1–1.03)
UROBILINOGEN UR QL: NORMAL

## 2022-07-19 PROCEDURE — 99214 OFFICE O/P EST MOD 30 MIN: CPT

## 2022-07-19 PROCEDURE — 81001 URINALYSIS AUTO W/SCOPE: CPT

## 2022-07-19 PROCEDURE — 51798 US URINE CAPACITY MEASURE: CPT

## 2022-07-19 RX ORDER — NITROFURANTOIN MACROCRYSTALS 50 MG/1
50 CAPSULE ORAL NIGHTLY
Qty: 90 CAPSULE | Refills: 0 | Status: SHIPPED | OUTPATIENT
Start: 2022-07-19 | End: 2022-11-16 | Stop reason: SDUPTHER

## 2022-07-19 RX ORDER — ESTRADIOL 0.1 MG/G
2 CREAM VAGINAL 2 TIMES WEEKLY
Qty: 42.5 G | Refills: 12 | Status: SHIPPED | OUTPATIENT
Start: 2022-07-21

## 2022-07-19 RX ORDER — PHENAZOPYRIDINE HYDROCHLORIDE 100 MG/1
100 TABLET, FILM COATED ORAL 3 TIMES DAILY PRN
Qty: 20 TABLET | Refills: 0 | Status: SHIPPED | OUTPATIENT
Start: 2022-07-19 | End: 2022-12-27

## 2022-07-19 NOTE — PROGRESS NOTES
July 19, 2022    Hello, may I speak with Keren Andrew?    My name is Lizet    I am  with [facility]  [address], [address]  [place] [address] [phone].    Before we get started may I verify your date of birth? 1945    I am calling to officially welcome you to our practice and ask about your recent visit. Is this a good time to talk? yes    Tell me about your visit with us. What things went well?  Yes, everything went very well.  I liked Cammy a lot.       We're always looking for ways to make our patients' experiences even better. Do you have recommendations on ways we may improve?  No, it couldn't have possibly been any better.  It was a great visit.    Overall were you satisfied with your first visit to our practice? Very much so.       I appreciate you taking the time to speak with me today. Is there anything else I can do for you? No thank you      Thank you, and have a great day.

## 2022-11-02 NOTE — PROGRESS NOTES
"Subjective    Ms. Andrew is 77 y.o. female    Chief Complaint: Follow-up recurrent UTI    History of Present Illness     77-year-old female established patient in for 3-month follow-up regarding recurrent urinary tract infections and incontinence.  Patient was started on nitrofurantoin prophylaxis at last visit as well as vaginal estrogen cream.    Patient reports no breakthrough infections.  Patient is in great spirits stating \"I am so excited what ever you did has really helped me\" patient reports she is no longer having the vaginal irritation or dysuria.  Stating this has even helped my incontinence and overactive bladder symptoms.  Patient remains on Myrbetriq 25 mg with great results.    The following portions of the patient's history were reviewed and updated as appropriate: allergies, current medications, past family history, past medical history, past social history, past surgical history and problem list.    Review of Systems   Constitutional: Negative for chills and fever.   Gastrointestinal: Negative for abdominal pain, anal bleeding, blood in stool, nausea and vomiting.   Genitourinary: Negative for decreased urine volume, difficulty urinating, dysuria, flank pain, frequency, hematuria, pelvic pain, urgency and vaginal pain.         Current Outpatient Medications:   •  Aspirin Buf,CaCarb-MgCarb-MgO, 81 MG tablet, Take 81 mg by mouth., Disp: , Rfl:   •  atenolol (TENORMIN) 25 MG tablet, 25 mg. daily, Disp: , Rfl:   •  calcium carbonate (OS-SAMANTA) 600 MG tablet, Take  by mouth., Disp: , Rfl:   •  cholecalciferol (VITAMIN D3) 25 MCG (1000 UT) tablet, Take 1,000 Units by mouth Daily., Disp: , Rfl:   •  clobetasol (TEMOVATE) 0.05 % cream, Apply  topically to the appropriate area as directed 2 (Two) Times a Day., Disp: 45 g, Rfl: 1  •  estradiol (ESTRACE) 0.1 MG/GM vaginal cream, Insert 2 g into the vagina 2 (Two) Times a Week., Disp: 42.5 g, Rfl: 12  •  indomethacin (INDOCIN) 50 MG capsule, Take 50 mg by " mouth., Disp: , Rfl:   •  meclizine 25 MG chewable tablet chewable tablet, Chew 25 mg., Disp: , Rfl:   •  Mirabegron ER (Myrbetriq) 25 MG tablet sustained-release 24 hour 24 hr tablet, Take 1 tablet by mouth Daily., Disp: 90 tablet, Rfl: 3  •  Multiple Vitamins-Minerals (MULTIVITAMIN AND MINERALS) liquid liquid, Take  by mouth Daily., Disp: , Rfl:   •  nitrofurantoin (MACRODANTIN) 50 MG capsule, Take 1 capsule by mouth Every Night., Disp: 90 capsule, Rfl: 0  •  phenazopyridine (PYRIDIUM) 100 MG tablet, Take 1 tablet by mouth 3 (Three) Times a Day As Needed for Bladder Spasms., Disp: 20 tablet, Rfl: 0  •  senna-docusate (PERICOLACE) 8.6-50 MG per tablet, Take  by mouth., Disp: , Rfl:   •  simvastatin (ZOCOR) 20 MG tablet, Take 20 mg by mouth., Disp: , Rfl:   •  triamterene-hydrochlorothiazide (DYAZIDE) 37.5-25 MG per capsule, Take  by mouth., Disp: , Rfl:     Past Medical History:   Diagnosis Date   • Arthritis    • Cancer (HCC)     skin ca   • Constipation    • Gout    • Hyperlipidemia    • Hypertension    • Neoplasm of uncertain behavior     lower lip   • Plantar fasciitis    • Sleep apnea     cpap at hs       Past Surgical History:   Procedure Laterality Date   • BACK SURGERY     • BLADDER SUSPENSION     • CERVICAL CONE BIOPSY     • COLONOSCOPY N/A 12/09/2021    Procedure: COLONOSCOPY WITH ANESTHESIA;  Surgeon: Dale Joyce DO;  Location: [facility] ENDOSCOPY;  Service: Gastroenterology;  Laterality: N/A;  pre screen  post diverticulosis  Francisco Barrios MD   • EYE SURGERY Left     basal cell carcinoma    • GANGLION CYST EXCISION     • HEMORROIDECTOMY     • HYSTERECTOMY      Abdominal hysterectomy bilateral salpingectomy-pt voices she only has 1 ovary but doesnt know which one       Social History     Socioeconomic History   • Marital status:    Tobacco Use   • Smoking status: Never   • Smokeless tobacco: Never   Vaping Use   • Vaping Use: Never used   Substance and Sexual Activity   • Alcohol use: Yes  "    Comment: rare   • Drug use: Never   • Sexual activity: Never       Family History   Problem Relation Age of Onset   • Diabetes Father    • Cancer Father    • Prostate cancer Father    • Diabetes Brother    • Diabetes Maternal Grandmother    • Breast cancer Niece    • Colon cancer Neg Hx    • Colon polyps Neg Hx    • Esophageal cancer Neg Hx        Objective    Temp 96.8 °F (36 °C)   Ht 157.5 cm (62\")   Wt 72.6 kg (160 lb)   BMI 29.26 kg/m²     Physical Exam  Nursing note reviewed.   Constitutional:       General: She is not in acute distress.     Appearance: Normal appearance. She is not ill-appearing.   HENT:      Nose: No congestion.   Abdominal:      Tenderness: There is no right CVA tenderness or left CVA tenderness.      Hernia: No hernia is present.   Skin:     General: Skin is warm and dry.   Neurological:      Mental Status: She is alert and oriented to person, place, and time.   Psychiatric:         Mood and Affect: Mood normal.         Behavior: Behavior normal.             Results for orders placed or performed in visit on 10/28/22   POC Urinalysis Dipstick, Multipro    Specimen: Urine   Result Value Ref Range    Color Yellow Yellow, Straw, Dark Yellow, Kelly    Clarity, UA Cloudy (A) Clear    Glucose, UA Negative Negative mg/dL    Bilirubin Negative Negative    Ketones, UA Negative Negative    Specific Gravity  1.020 1.005 - 1.030    Blood, UA Negative Negative    pH, Urine 7.5 5.0 - 8.0    Protein, POC Negative Negative mg/dL    Urobilinogen, UA 0.2 E.U./dL Normal, 0.2 E.U./dL    Nitrite, UA Negative Negative    Leukocytes Small (1+) (A) Negative     Assessment and Plan    Diagnoses and all orders for this visit:    1. Recurrent UTI (Primary)    2. Atrophic vaginitis    3. Mixed incontinence      77-year-old female established patient in for 3-month follow-up regarding recurrent urinary tract infections and incontinence.  Patient was started on nitrofurantoin prophylaxis at last visit as well as " "vaginal estrogen cream.    Patient reports no breakthrough infections.  Patient is in great spirits stating \"I am so excited what ever you did has really helped me\" patient reports she is no longer having the vaginal irritation or dysuria.  Stating this has even helped my incontinence and overactive bladder symptoms.  Patient remains on Myrbetriq 25 mg with great results.    Urinalysis today small leukocytes only.     Patient asymptomatic    Will stop nitrofurantoin prophylaxis.    Continue twice weekly vaginal estrogen cream    Continue Myrbetriq 25 mg    Patient would like to follow-up on an as-needed basis stating \"I now feel as though my primary care doctor can treat me\".  Which I feel is very reasonable and would be glad to assist patient in the future when needed.      "

## 2022-11-04 ENCOUNTER — OFFICE VISIT (OUTPATIENT)
Dept: UROLOGY | Facility: CLINIC | Age: 77
End: 2022-11-04

## 2022-11-04 VITALS — WEIGHT: 160 LBS | HEIGHT: 62 IN | TEMPERATURE: 96.8 F | BODY MASS INDEX: 29.44 KG/M2

## 2022-11-04 DIAGNOSIS — N39.0 RECURRENT UTI: Primary | ICD-10-CM

## 2022-11-04 DIAGNOSIS — N95.2 ATROPHIC VAGINITIS: ICD-10-CM

## 2022-11-04 DIAGNOSIS — N39.46 MIXED INCONTINENCE: ICD-10-CM

## 2022-11-04 PROCEDURE — 81001 URINALYSIS AUTO W/SCOPE: CPT

## 2022-11-04 PROCEDURE — 99214 OFFICE O/P EST MOD 30 MIN: CPT

## 2022-11-16 ENCOUNTER — TELEPHONE (OUTPATIENT)
Dept: UROLOGY | Facility: CLINIC | Age: 77
End: 2022-11-16

## 2022-11-16 DIAGNOSIS — N39.0 RECURRENT UTI: ICD-10-CM

## 2022-11-16 RX ORDER — NITROFURANTOIN MACROCRYSTALS 50 MG/1
50 CAPSULE ORAL NIGHTLY
Qty: 90 CAPSULE | Refills: 0 | Status: SHIPPED | OUTPATIENT
Start: 2022-11-16 | End: 2022-11-17 | Stop reason: SDUPTHER

## 2022-11-16 RX ORDER — NITROFURANTOIN MACROCRYSTALS 50 MG/1
50 CAPSULE ORAL NIGHTLY
Qty: 90 CAPSULE | Refills: 0 | Status: SHIPPED | OUTPATIENT
Start: 2022-11-16 | End: 2022-11-16

## 2022-11-16 NOTE — TELEPHONE ENCOUNTER
At last visit pt felt as though she did not need to continue the medication as she had completed the 3 month course. Does she feel like her infection is back or something? If so she will need to be seen before reordering the nitrofurantoin low dose therapy

## 2022-11-16 NOTE — TELEPHONE ENCOUNTER
Caller: Keren Andrew    Relationship: SELF  Best call back number: [phone]    Requested Prescriptions:   Requested Prescriptions      No prescriptions requested or ordered in this encounter   NITROFURANTOIN (MACRODANTIN) 50MG CAPSULES     Pharmacy where request should be sent:  [facility]    Additional details provided by patient: PT CALLED AND STATED THAT SHE WASN'T FEELING IN CONTROL AS MUCH SINCE BEING OF THE MEDICATION. AFTER SPEAK WITH IRENA FLORES WOULD LIKE TO HAVE REFILL COMPLETED AND SENT TO [facility].    Does the patient have less than a 3 day supply:  [x] Yes  [] No    Rahel Yoon Rep   11/16/22 08:46 CST         DELETE AFTER READING TO PATIENT: “Thank you for sharing this information with me. I will send a message to the clinical team. Please allow 48 hours for the clinical staff to follow up on this request.”

## 2022-11-17 ENCOUNTER — OFFICE VISIT (OUTPATIENT)
Dept: UROLOGY | Facility: CLINIC | Age: 77
End: 2022-11-17

## 2022-11-17 VITALS — TEMPERATURE: 97.2 F | HEIGHT: 62 IN | WEIGHT: 159 LBS | BODY MASS INDEX: 29.26 KG/M2

## 2022-11-17 DIAGNOSIS — N39.0 RECURRENT UTI: Primary | ICD-10-CM

## 2022-11-17 LAB
BILIRUB BLD-MCNC: NEGATIVE MG/DL
CLARITY, POC: CLEAR
COLOR UR: YELLOW
GLUCOSE UR STRIP-MCNC: NEGATIVE MG/DL
KETONES UR QL: NEGATIVE
LEUKOCYTE EST, POC: NEGATIVE
NITRITE UR-MCNC: NEGATIVE MG/ML
PH UR: 7 [PH] (ref 5–8)
PROT UR STRIP-MCNC: NEGATIVE MG/DL
RBC # UR STRIP: ABNORMAL /UL
SP GR UR: 1.02 (ref 1–1.03)
UROBILINOGEN UR QL: ABNORMAL

## 2022-11-17 PROCEDURE — 99213 OFFICE O/P EST LOW 20 MIN: CPT

## 2022-11-17 PROCEDURE — 81001 URINALYSIS AUTO W/SCOPE: CPT

## 2022-11-17 PROCEDURE — 51798 US URINE CAPACITY MEASURE: CPT

## 2022-11-17 RX ORDER — NITROFURANTOIN MACROCRYSTALS 50 MG/1
50 CAPSULE ORAL NIGHTLY
Qty: 90 CAPSULE | Refills: 0 | Status: SHIPPED | OUTPATIENT
Start: 2022-11-17 | End: 2023-03-23

## 2022-12-23 DIAGNOSIS — N39.46 MIXED INCONTINENCE: ICD-10-CM

## 2022-12-23 DIAGNOSIS — R30.0 DYSURIA: ICD-10-CM

## 2022-12-24 RX ORDER — MIRABEGRON 25 MG/1
TABLET, FILM COATED, EXTENDED RELEASE ORAL
Qty: 90 TABLET | Refills: 0 | Status: SHIPPED | OUTPATIENT
Start: 2022-12-24 | End: 2023-03-15

## 2022-12-27 RX ORDER — PHENAZOPYRIDINE HYDROCHLORIDE 100 MG/1
TABLET, FILM COATED ORAL
Qty: 20 TABLET | Refills: 51 | Status: SHIPPED | OUTPATIENT
Start: 2022-12-27

## 2023-01-31 DIAGNOSIS — L90.0 LICHEN SCLEROSUS: ICD-10-CM

## 2023-01-31 RX ORDER — CLOBETASOL PROPIONATE 0.5 MG/G
CREAM TOPICAL 2 TIMES DAILY
Qty: 45 G | Refills: 1 | OUTPATIENT
Start: 2023-01-31

## 2023-02-10 NOTE — PROGRESS NOTES
Subjective    Ms. Andrew is 77 y.o. female    Chief Complaint: Recurrent UTI    History of Present Illness     77-year-old female established patient in for 3-month follow-up regarding history of chronic cystitis and overactive bladder.  Patient reports 0 infections since starting on the prophylaxis now 6 months ago.  Remains on the vaginal estrogen cream 2 times weekly.    On Myrbetriq 25 mg daily for history of OAB.  Symptoms well controlled reports very infrequent scant urinary leakage.     The following portions of the patient's history were reviewed and updated as appropriate: allergies, current medications, past family history, past medical history, past social history, past surgical history and problem list.    Review of Systems   Constitutional: Negative for chills, fatigue and fever.   Gastrointestinal: Negative for nausea and vomiting.   Genitourinary: Positive for urgency. Negative for decreased urine volume, difficulty urinating, dysuria, flank pain, frequency, hematuria, pelvic pain and vaginal pain.         Current Outpatient Medications:   •  Aspirin Buf,CaCarb-MgCarb-MgO, 81 MG tablet, Take 81 mg by mouth., Disp: , Rfl:   •  atenolol (TENORMIN) 25 MG tablet, 25 mg. daily, Disp: , Rfl:   •  calcium carbonate (OS-SAMANTA) 600 MG tablet, Take  by mouth., Disp: , Rfl:   •  cholecalciferol (VITAMIN D3) 25 MCG (1000 UT) tablet, Take 1,000 Units by mouth Daily., Disp: , Rfl:   •  clobetasol (TEMOVATE) 0.05 % cream, Apply  topically to the appropriate area as directed 2 (Two) Times a Day., Disp: 45 g, Rfl: 1  •  estradiol (ESTRACE) 0.1 MG/GM vaginal cream, Insert 2 g into the vagina 2 (Two) Times a Week., Disp: 42.5 g, Rfl: 12  •  indomethacin (INDOCIN) 50 MG capsule, Take 50 mg by mouth., Disp: , Rfl:   •  meclizine 25 MG chewable tablet chewable tablet, Chew 25 mg., Disp: , Rfl:   •  Multiple Vitamins-Minerals (MULTIVITAMIN AND MINERALS) liquid liquid, Take  by mouth Daily., Disp: , Rfl:   •  Myrbetriq 25 MG  tablet sustained-release 24 hour 24 hr tablet, TAKE 1 TABLET DAILY, Disp: 90 tablet, Rfl: 0  •  nitrofurantoin (MACRODANTIN) 50 MG capsule, Take 1 capsule by mouth Every Night., Disp: 90 capsule, Rfl: 0  •  phenazopyridine (PYRIDIUM) 100 MG tablet, TAKE 1 TABLET THREE TIMES A DAY AS NEEDED FOR BLADDER SPASMS, Disp: 20 tablet, Rfl: 51  •  senna-docusate (PERICOLACE) 8.6-50 MG per tablet, Take  by mouth., Disp: , Rfl:   •  simvastatin (ZOCOR) 20 MG tablet, Take 20 mg by mouth., Disp: , Rfl:   •  triamterene-hydrochlorothiazide (DYAZIDE) 37.5-25 MG per capsule, Take  by mouth., Disp: , Rfl:     Past Medical History:   Diagnosis Date   • Arthritis    • Cancer (HCC)     skin ca   • Constipation    • Gout    • Hyperlipidemia    • Hypertension    • Neoplasm of uncertain behavior     lower lip   • Plantar fasciitis    • Sleep apnea     cpap at hs       Past Surgical History:   Procedure Laterality Date   • BACK SURGERY     • BLADDER SUSPENSION     • CERVICAL CONE BIOPSY     • COLONOSCOPY N/A 12/09/2021    Procedure: COLONOSCOPY WITH ANESTHESIA;  Surgeon: Dale Joyce DO;  Location: [facility] ENDOSCOPY;  Service: Gastroenterology;  Laterality: N/A;  pre screen  post diverticulosis  Francisco Barrios MD   • EYE SURGERY Left     basal cell carcinoma    • GANGLION CYST EXCISION     • HEMORROIDECTOMY     • HYSTERECTOMY      Abdominal hysterectomy bilateral salpingectomy-pt voices she only has 1 ovary but doesnt know which one       Social History     Socioeconomic History   • Marital status:    Tobacco Use   • Smoking status: Never   • Smokeless tobacco: Never   Vaping Use   • Vaping Use: Never used   Substance and Sexual Activity   • Alcohol use: Yes     Comment: rare   • Drug use: Never   • Sexual activity: Never       Family History   Problem Relation Age of Onset   • Diabetes Father    • Cancer Father    • Prostate cancer Father    • Diabetes Brother    • Diabetes Maternal Grandmother    • Breast cancer Niece    •  "Colon cancer Neg Hx    • Colon polyps Neg Hx    • Esophageal cancer Neg Hx        Objective    Temp 97 °F (36.1 °C)   Ht 157.5 cm (62\")   Wt 73.4 kg (161 lb 12.8 oz)   BMI 29.59 kg/m²     Physical Exam  Nursing note reviewed.   Constitutional:       General: She is not in acute distress.     Appearance: Normal appearance. She is not ill-appearing.   HENT:      Nose: No congestion.   Abdominal:      Tenderness: There is no right CVA tenderness or left CVA tenderness.      Hernia: No hernia is present.   Skin:     General: Skin is warm and dry.   Neurological:      Mental Status: She is alert and oriented to person, place, and time.   Psychiatric:         Mood and Affect: Mood normal.         Behavior: Behavior normal.             Results for orders placed or performed in visit on 02/17/23   POC Urinalysis Dipstick, Multipro    Specimen: Urine   Result Value Ref Range    Color Yellow Yellow, Straw, Dark Yellow, Kelly    Clarity, UA Clear Clear    Glucose, UA Negative Negative mg/dL    Bilirubin Negative Negative    Ketones, UA Negative Negative    Specific Gravity  1.020 1.005 - 1.030    Blood, UA Negative Negative    pH, Urine 7.0 5.0 - 8.0    Protein, POC Negative Negative mg/dL    Urobilinogen, UA 0.2 E.U./dL Normal, 0.2 E.U./dL    Nitrite, UA Negative Negative    Leukocytes Small (1+) (A) Negative   Bladder Scan interpretation  Estimation of residual urine via abdominal ultrasound  Residual Urine: 22ml  Indication: UTI  Position: Supine  Examination: Incremental scanning of the suprapubic area using 3 MHz transducer using copious amounts of acoustic gel.   Findings: An anechoic area was demonstrated which represented the bladder, with measurement of residual urine as noted. I inspected this myself. In that the residual urine was stable or insignificant, no treatment will be necessary at this time.     Assessment and Plan    Diagnoses and all orders for this visit:    1. Recurrent UTI (Primary)  -     POC " Urinalysis Dipstick, Multipro    2. OAB (overactive bladder)      77-year-old female established patient in for 3-month follow-up regarding history of chronic cystitis and overactive bladder.     0 infections since starting on the prophylaxis now 6 months ago.      Remains on the vaginal estrogen cream 2 times weekly.    On Myrbetriq 25 mg daily for history of OAB.  Symptoms well controlled reports very infrequent scant urinary leakage.    Urinalysis only small leukocytes noted.  Patient asymptomatic    Continue Myrbetriq 25 mg daily    We will stop the nitrofurantoin prophylaxis as she has now completed 6 full months and has seen great results    Continue vaginal Premarin 2 times weekly    Follow-up in 6 months for reevaluation

## 2023-02-17 ENCOUNTER — OFFICE VISIT (OUTPATIENT)
Dept: UROLOGY | Facility: CLINIC | Age: 78
End: 2023-02-17
Payer: MEDICARE

## 2023-02-17 VITALS — HEIGHT: 62 IN | TEMPERATURE: 97 F | WEIGHT: 161.8 LBS | BODY MASS INDEX: 29.77 KG/M2

## 2023-02-17 DIAGNOSIS — N39.0 RECURRENT UTI: Primary | ICD-10-CM

## 2023-02-17 DIAGNOSIS — N32.81 OAB (OVERACTIVE BLADDER): ICD-10-CM

## 2023-02-17 LAB
BILIRUB BLD-MCNC: NEGATIVE MG/DL
CLARITY, POC: CLEAR
COLOR UR: YELLOW
GLUCOSE UR STRIP-MCNC: NEGATIVE MG/DL
KETONES UR QL: NEGATIVE
LEUKOCYTE EST, POC: ABNORMAL
NITRITE UR-MCNC: NEGATIVE MG/ML
PH UR: 7 [PH] (ref 5–8)
PROT UR STRIP-MCNC: NEGATIVE MG/DL
RBC # UR STRIP: NEGATIVE /UL
SP GR UR: 1.02 (ref 1–1.03)
UROBILINOGEN UR QL: ABNORMAL

## 2023-02-17 PROCEDURE — 99214 OFFICE O/P EST MOD 30 MIN: CPT

## 2023-02-17 PROCEDURE — 81001 URINALYSIS AUTO W/SCOPE: CPT

## 2023-02-17 PROCEDURE — 51798 US URINE CAPACITY MEASURE: CPT

## 2023-02-20 ENCOUNTER — TELEPHONE (OUTPATIENT)
Dept: OBSTETRICS AND GYNECOLOGY | Facility: CLINIC | Age: 78
End: 2023-02-20
Payer: COMMERCIAL

## 2023-02-20 RX ORDER — CLOBETASOL PROPIONATE 0.5 MG/G
1 CREAM TOPICAL 2 TIMES DAILY
Qty: 60 G | Refills: 0 | OUTPATIENT
Start: 2023-02-20 | End: 2023-02-23 | Stop reason: SDUPTHER

## 2023-02-23 ENCOUNTER — TELEPHONE (OUTPATIENT)
Dept: OBSTETRICS AND GYNECOLOGY | Facility: CLINIC | Age: 78
End: 2023-02-23
Payer: COMMERCIAL

## 2023-02-23 DIAGNOSIS — L90.0 LICHEN SCLEROSUS: ICD-10-CM

## 2023-02-23 RX ORDER — CLOBETASOL PROPIONATE 0.5 MG/G
CREAM TOPICAL 2 TIMES DAILY
Qty: 45 G | Refills: 1 | Status: SHIPPED | OUTPATIENT
Start: 2023-02-23

## 2023-02-23 NOTE — TELEPHONE ENCOUNTER
Caller: Keren Andrew    Relationship: Self    Best call back number: [phone]    Requested Prescriptions:   clobetasol prop emollient base (TEMOVATE) 0.05 % emollient cream [69458]       Requested Prescriptions      No prescriptions requested or ordered in this encounter        Pharmacy where request should be sent:  [facility] - PHONE # [phone]    Additional details provided by patient: PT CALLED TO GIVE THE NUMBER FOR [facility] SO THE ABOVE PRESCRIPTION CAN BE SENT TO THEM.  PLEASE CALL PT IS ANY QUESTIONS.    Does the patient have less than a 3 day supply:  [x] Yes  [] No    Would you like a call back once the refill request has been completed: [x] Yes [] No    If the office needs to give you a call back, can they leave a voicemail: [x] Yes [] No    Rahel Trimble Rep   02/23/23 10:10 CST

## 2023-03-14 DIAGNOSIS — N39.46 MIXED INCONTINENCE: ICD-10-CM

## 2023-03-15 RX ORDER — MIRABEGRON 25 MG/1
TABLET, FILM COATED, EXTENDED RELEASE ORAL
Qty: 90 TABLET | Refills: 0 | Status: SHIPPED | OUTPATIENT
Start: 2023-03-15 | End: 2023-03-23 | Stop reason: SDUPTHER

## 2023-03-23 ENCOUNTER — OFFICE VISIT (OUTPATIENT)
Dept: OBSTETRICS AND GYNECOLOGY | Facility: CLINIC | Age: 78
End: 2023-03-23
Payer: MEDICARE

## 2023-03-23 VITALS
BODY MASS INDEX: 27.97 KG/M2 | WEIGHT: 152 LBS | DIASTOLIC BLOOD PRESSURE: 82 MMHG | SYSTOLIC BLOOD PRESSURE: 142 MMHG | HEIGHT: 62 IN

## 2023-03-23 DIAGNOSIS — Z78.9 NONSMOKER: ICD-10-CM

## 2023-03-23 DIAGNOSIS — Z12.31 BREAST CANCER SCREENING BY MAMMOGRAM: ICD-10-CM

## 2023-03-23 DIAGNOSIS — L90.0 LICHEN SCLEROSUS: Primary | ICD-10-CM

## 2023-03-23 DIAGNOSIS — N39.46 MIXED INCONTINENCE: ICD-10-CM

## 2023-03-23 PROCEDURE — 99213 OFFICE O/P EST LOW 20 MIN: CPT | Performed by: OBSTETRICS & GYNECOLOGY

## 2023-03-23 NOTE — PROGRESS NOTES
"Subjective   Chief Complaint   Patient presents with   • Urinary Incontinence     Pt here today for follow up on urinary incontinence. Pt voices she is doing well and is not having as much leakage. Pt wanting to discuss estrace cream. Pt voices no other concerns.      Keren Andrew is a 77 y.o. year old .  No LMP recorded. Patient has had a hysterectomy.  She presents to be seen for follow-up of urge incontinence that is being managed with Myrbetriq.  Patient could not tolerate 50 mg dose due to side effects, but is doing well with 25 mg dose.  Patient very pleased and reports that she has not had any UTI's in almost 6 months since she has been using estrace vaginal cream.    Patient also using temovate cream and reports that itching is no longer a problem.     The following portions of the patient's history were reviewed and updated as appropriate:current medications and allergies    Social History    Tobacco Use      Smoking status: Never      Smokeless tobacco: Never    Review of Systems   Constitutional: Negative for activity change and unexpected weight change.   Respiratory: Negative for shortness of breath.    Cardiovascular: Negative for chest pain.   Gastrointestinal: Negative for abdominal pain.   Genitourinary: Negative for enuresis (very rare urge incontinence since starting Myrbetriq and estrogen cream), frequency, pelvic pain, urgency, vaginal discharge and vaginal pain.   Neurological: Negative for dizziness.   Psychiatric/Behavioral: Negative for confusion.         Objective   /82   Ht 157.5 cm (62\")   Wt 68.9 kg (152 lb)   BMI 27.80 kg/m²     Physical Exam  Vitals and nursing note reviewed.   Constitutional:       General: She is not in acute distress.     Appearance: She is well-developed.   HENT:      Head: Normocephalic and atraumatic.   Neck:      Thyroid: No thyromegaly.   Pulmonary:      Effort: Pulmonary effort is normal.   Musculoskeletal:         General: Normal range of " motion.      Cervical back: Normal range of motion.   Skin:     General: Skin is warm and dry.   Neurological:      Mental Status: She is alert and oriented to person, place, and time.   Psychiatric:         Mood and Affect: Mood normal.         Behavior: Behavior normal.         Thought Content: Thought content normal.         Judgment: Judgment normal.         Lab Review   No data reviewed    Imaging   No data reviewed     Assessment & Plan    Diagnoses and all orders for this visit:    1. Lichen sclerosus (Primary): Patient reports symptoms completely resolved.  She is not having any itching at all, as long as she uses her clobetasol cream twice weekly.  Patient does not need refills at this time    2. Mixed incontinence: Refills of Myrbetriq sent.  Patient is not having any side effects with the 25 mg dose, although she could not tolerate the 50 mg dose.  Patient very satisfied and wants to continue.  -     Mirabegron ER (Myrbetriq) 25 MG tablet sustained-release 24 hour 24 hr tablet; Take 1 tablet by mouth Daily.  Dispense: 90 tablet; Refill: 3    3. Nonsmoker    4. Breast cancer screening by mammogram: Patient due for mammogram, her last one was in December 2021.  Bone density testing is up-to-date  -     Mammo Screening Bilateral With CAD; Future      This note was electronically signed.    Kelly Milian MD  March 23, 2023  08:28 CDT    Total time spent today with Keren  was 20-29 minutes (level 3).  Greater than 50% of the time was spent coordinating care, answering her questions and counseling regarding pathophysiology of her presenting problem along with plans for any diagnositc work-up and treatment.

## 2023-04-14 ENCOUNTER — HOSPITAL ENCOUNTER (OUTPATIENT)
Dept: MAMMOGRAPHY | Facility: HOSPITAL | Age: 78
Discharge: HOME | End: 2023-04-14
Admitting: OBSTETRICS & GYNECOLOGY
Payer: MEDICARE

## 2023-04-14 DIAGNOSIS — Z12.31 BREAST CANCER SCREENING BY MAMMOGRAM: ICD-10-CM

## 2023-04-14 PROCEDURE — 77067 SCR MAMMO BI INCL CAD: CPT

## 2023-04-14 PROCEDURE — 77063 BREAST TOMOSYNTHESIS BI: CPT

## 2023-06-13 DIAGNOSIS — N39.46 MIXED INCONTINENCE: ICD-10-CM

## 2023-06-13 RX ORDER — MIRABEGRON 25 MG/1
TABLET, FILM COATED, EXTENDED RELEASE ORAL
Qty: 90 TABLET | Refills: 1 | OUTPATIENT
Start: 2023-06-13

## 2023-08-03 NOTE — PROGRESS NOTES
Subjective    Ms. Andrew is 77 y.o. female    Chief Complaint: Recurrent UTI    History of Present Illness    77-year-old female established patient in for follow-up regarding history of chronic cystitis and overactive bladder.  reports had been doing well over the last 6 months until this past Sunday when pt had return of urethral irritation and mild back pain. Pt was previously tx with prophylactic nitrofurantoin. Remains on the vaginal estrogen cream 2 times weekly.     On Myrbetriq 25 mg daily for history of OAB.  Symptoms well controlled reports very infrequent scant urinary leakage.    The following portions of the patient's history were reviewed and updated as appropriate: allergies, current medications, past family history, past medical history, past social history, past surgical history and problem list.    Review of Systems   Constitutional:  Negative for chills, fatigue and fever.   Gastrointestinal:  Negative for abdominal pain, anal bleeding, blood in stool, nausea and vomiting.   Genitourinary:  Positive for dysuria, frequency, pelvic pain and urgency. Negative for decreased urine volume, difficulty urinating, flank pain, hematuria and vaginal pain.   Musculoskeletal:  Positive for back pain.       Current Outpatient Medications:     Aspirin Buf,CaCarb-MgCarb-MgO, 81 MG tablet, Take 81 mg by mouth., Disp: , Rfl:     atenolol (TENORMIN) 25 MG tablet, 1 tablet. daily, Disp: , Rfl:     calcium carbonate (OS-SAMANTA) 600 MG tablet, Take  by mouth., Disp: , Rfl:     clobetasol (Temovate) 0.05 % cream, Apply  topically to the appropriate area as directed 2 (Two) Times a Day., Disp: 45 g, Rfl: 3    estradiol (ESTRACE) 0.1 MG/GM vaginal cream, Insert 2 g into the vagina 2 (Two) Times a Week., Disp: 42.5 g, Rfl: 12    indomethacin (INDOCIN) 50 MG capsule, Take 1 capsule by mouth., Disp: , Rfl:     losartan-hydrochlorothiazide (HYZAAR) 50-12.5 MG per tablet, , Disp: , Rfl:     meclizine 25 MG chewable tablet  chewable tablet, Chew 1 tablet., Disp: , Rfl:     Mirabegron ER (Myrbetriq) 25 MG tablet sustained-release 24 hour 24 hr tablet, Take 1 tablet by mouth Daily., Disp: 90 tablet, Rfl: 3    Multiple Vitamins-Minerals (MULTIVITAMIN AND MINERALS) liquid liquid, Take  by mouth Daily., Disp: , Rfl:     phenazopyridine (PYRIDIUM) 100 MG tablet, TAKE 1 TABLET THREE TIMES A DAY AS NEEDED FOR BLADDER SPASMS, Disp: 20 tablet, Rfl: 51    Polyethylene Glycol 3350 (CLEARLAX PO), Take  by mouth., Disp: , Rfl:     simvastatin (ZOCOR) 20 MG tablet, Take 1 tablet by mouth., Disp: , Rfl:     cholecalciferol (VITAMIN D3) 25 MCG (1000 UT) tablet, Take 1 tablet by mouth Daily. (Patient not taking: Reported on 8/17/2023), Disp: , Rfl:     triamterene-hydrochlorothiazide (DYAZIDE) 37.5-25 MG per capsule, Take  by mouth. (Patient not taking: Reported on 8/17/2023), Disp: , Rfl:     Past Medical History:   Diagnosis Date    Arthritis     Cancer     skin ca    Constipation     Gout     Hyperlipidemia     Hypertension     Neoplasm of uncertain behavior     lower lip    Plantar fasciitis     Sleep apnea     cpap at hs       Past Surgical History:   Procedure Laterality Date    BACK SURGERY      BLADDER SUSPENSION      CERVICAL CONE BIOPSY      COLONOSCOPY N/A 12/09/2021    Procedure: COLONOSCOPY WITH ANESTHESIA;  Surgeon: Dale Joyce DO;  Location: [facility] ENDOSCOPY;  Service: Gastroenterology;  Laterality: N/A;  pre screen  post diverticulosis  Francisco Barrios MD    EYE SURGERY Left     basal cell carcinoma     GANGLION CYST EXCISION      HEMORROIDECTOMY      HYSTERECTOMY      Abdominal hysterectomy bilateral salpingectomy-pt voices she only has 1 ovary but doesnt know which one       Social History     Socioeconomic History    Marital status:    Tobacco Use    Smoking status: Never    Smokeless tobacco: Never   Vaping Use    Vaping Use: Never used   Substance and Sexual Activity    Alcohol use: Yes     Comment: rare    Drug use:  "Never    Sexual activity: Never       Family History   Problem Relation Age of Onset    Diabetes Father     Cancer Father     Prostate cancer Father     Diabetes Brother     Diabetes Maternal Grandmother     Breast cancer Niece     Colon cancer Neg Hx     Colon polyps Neg Hx     Esophageal cancer Neg Hx        Objective    Temp 97.2 øF (36.2 øC)   Ht 157.5 cm (62.01\")   Wt 71.7 kg (158 lb)   BMI 28.89 kg/mý     Physical Exam  Nursing note reviewed.   Constitutional:       General: She is not in acute distress.     Appearance: Normal appearance. She is not ill-appearing.   HENT:      Nose: No congestion.   Abdominal:      Tenderness: There is no right CVA tenderness or left CVA tenderness.      Hernia: No hernia is present.   Skin:     General: Skin is warm and dry.   Neurological:      Mental Status: She is alert and oriented to person, place, and time.   Psychiatric:         Mood and Affect: Mood normal.         Behavior: Behavior normal.           Results for orders placed or performed in visit on 08/17/23   POC Urinalysis Dipstick, Multipro    Specimen: Urine   Result Value Ref Range    Color Yellow Yellow, Straw, Dark Yellow, Kelly    Clarity, UA Clear Clear    Glucose, UA Negative Negative mg/dL    Bilirubin Negative Negative    Ketones, UA Negative Negative    Specific Gravity  1.015 1.005 - 1.030    Blood, UA Negative Negative    pH, Urine 7.0 5.0 - 8.0    Protein, POC Negative Negative mg/dL    Urobilinogen, UA 0.2 E.U./dL Normal, 0.2 E.U./dL    Nitrite, UA Negative Negative    Leukocytes Trace (A) Negative     Assessment and Plan    Diagnoses and all orders for this visit:    1. Recurrent UTI (Primary)  -     POC Urinalysis Dipstick, Multipro  -     Urine Culture - Urine, Urine, Random Void    2. OAB (overactive bladder)  -     POC Urinalysis Dipstick, Multipro    77-year-old female established patient in for follow-up regarding history of chronic cystitis and overactive bladder.  reports had been doing " well over the last 6 months until this past Sunday when pt had return of urethral irritation and mild back pain. Pt was previously tx with prophylactic nitrofurantoin. Remains on the vaginal estrogen cream 2 times weekly.     On Myrbetriq 25 mg daily for history of OAB.  Symptoms well controlled reports very infrequent scant urinary leakage.    Urinalysis today showing trace leukocytes only.  No bacteria visualized under the microscope however given patient's return of symptoms we will go ahead and send urine for culture    We will wait to treat until culture result is back    Patient to continue Myrbetriq 25 mg daily due to history of overactive bladder and urge incontinence as it is working well for patient we will continue estradiol estrogen cream twice weekly    For now we will have patient follow-up in 6 months unless urine culture comes back positive then would like to see patient sooner

## 2023-08-17 ENCOUNTER — OFFICE VISIT (OUTPATIENT)
Dept: UROLOGY | Facility: CLINIC | Age: 78
End: 2023-08-17
Payer: MEDICARE

## 2023-08-17 VITALS — TEMPERATURE: 97.2 F | BODY MASS INDEX: 29.08 KG/M2 | WEIGHT: 158 LBS | HEIGHT: 62 IN

## 2023-08-17 DIAGNOSIS — N39.0 RECURRENT UTI: Primary | ICD-10-CM

## 2023-08-17 DIAGNOSIS — N32.81 OAB (OVERACTIVE BLADDER): ICD-10-CM

## 2023-08-17 LAB
BILIRUB BLD-MCNC: NEGATIVE MG/DL
CLARITY, POC: CLEAR
COLOR UR: YELLOW
GLUCOSE UR STRIP-MCNC: NEGATIVE MG/DL
KETONES UR QL: NEGATIVE
LEUKOCYTE EST, POC: ABNORMAL
NITRITE UR-MCNC: NEGATIVE MG/ML
PH UR: 7 [PH] (ref 5–8)
PROT UR STRIP-MCNC: NEGATIVE MG/DL
RBC # UR STRIP: NEGATIVE /UL
SP GR UR: 1.01 (ref 1–1.03)
UROBILINOGEN UR QL: ABNORMAL

## 2023-08-17 PROCEDURE — 87186 SC STD MICRODIL/AGAR DIL: CPT

## 2023-08-17 PROCEDURE — 87086 URINE CULTURE/COLONY COUNT: CPT

## 2023-08-17 RX ORDER — LOSARTAN POTASSIUM AND HYDROCHLOROTHIAZIDE 12.5; 5 MG/1; MG/1
TABLET ORAL
COMMUNITY
Start: 2023-05-09

## 2023-08-19 LAB — BACTERIA SPEC AEROBE CULT: ABNORMAL

## 2023-08-20 DIAGNOSIS — N30.00 ACUTE CYSTITIS WITHOUT HEMATURIA: Primary | ICD-10-CM

## 2023-08-20 RX ORDER — NITROFURANTOIN 25; 75 MG/1; MG/1
100 CAPSULE ORAL 2 TIMES DAILY
Qty: 14 CAPSULE | Refills: 0 | Status: SHIPPED | OUTPATIENT
Start: 2023-08-20

## 2023-08-21 ENCOUNTER — TELEPHONE (OUTPATIENT)
Dept: UROLOGY | Facility: CLINIC | Age: 78
End: 2023-08-21
Payer: OTHER GOVERNMENT

## 2023-08-21 NOTE — TELEPHONE ENCOUNTER
Left a message for patient to let her know her urine culture came back positive and that we sent her in a antibiotic to her pharmacy.

## 2023-08-21 NOTE — TELEPHONE ENCOUNTER
----- Message from MOSES Graham sent at 8/20/2023 10:13 AM CDT -----  Cutlure is positive and I have sent in macrobid to pharmacy

## 2023-08-26 ENCOUNTER — APPOINTMENT (OUTPATIENT)
Dept: CT IMAGING | Facility: HOSPITAL | Age: 78
End: 2023-08-26
Payer: MEDICARE

## 2023-08-26 ENCOUNTER — HOSPITAL ENCOUNTER (EMERGENCY)
Facility: HOSPITAL | Age: 78
Discharge: HOME | End: 2023-08-26
Attending: FAMILY MEDICINE
Payer: MEDICARE

## 2023-08-26 VITALS
SYSTOLIC BLOOD PRESSURE: 165 MMHG | TEMPERATURE: 97.6 F | BODY MASS INDEX: 29.08 KG/M2 | WEIGHT: 158 LBS | RESPIRATION RATE: 18 BRPM | OXYGEN SATURATION: 100 % | HEIGHT: 62 IN | DIASTOLIC BLOOD PRESSURE: 58 MMHG | HEART RATE: 65 BPM

## 2023-08-26 DIAGNOSIS — M25.552 LEFT HIP PAIN: ICD-10-CM

## 2023-08-26 DIAGNOSIS — K59.00 CONSTIPATION, UNSPECIFIED CONSTIPATION TYPE: ICD-10-CM

## 2023-08-26 DIAGNOSIS — S76.212A STRAIN OF LEFT GROIN: Primary | ICD-10-CM

## 2023-08-26 LAB
ALBUMIN SERPL-MCNC: 3.9 G/DL (ref 3.5–5.2)
ALBUMIN/GLOB SERPL: 1.5 G/DL
ALP SERPL-CCNC: 93 U/L (ref 39–117)
ALT SERPL W P-5'-P-CCNC: 17 U/L (ref 1–33)
ANION GAP SERPL CALCULATED.3IONS-SCNC: 10 MMOL/L (ref 5–15)
AST SERPL-CCNC: 22 U/L (ref 1–32)
BASOPHILS # BLD AUTO: 0.04 10*3/MM3 (ref 0–0.2)
BASOPHILS NFR BLD AUTO: 0.4 % (ref 0–1.5)
BILIRUB SERPL-MCNC: 0.5 MG/DL (ref 0–1.2)
BUN SERPL-MCNC: 16 MG/DL (ref 8–23)
BUN/CREAT SERPL: 25 (ref 7–25)
CALCIUM SPEC-SCNC: 9 MG/DL (ref 8.6–10.5)
CHLORIDE SERPL-SCNC: 101 MMOL/L (ref 98–107)
CK SERPL-CCNC: 32 U/L (ref 20–180)
CO2 SERPL-SCNC: 27 MMOL/L (ref 22–29)
CREAT SERPL-MCNC: 0.64 MG/DL (ref 0.57–1)
D DIMER PPP FEU-MCNC: 0.43 MCGFEU/ML (ref 0–0.75)
DEPRECATED RDW RBC AUTO: 42.8 FL (ref 37–54)
EGFRCR SERPLBLD CKD-EPI 2021: 92.3 ML/MIN/1.73
EOSINOPHIL # BLD AUTO: 0.15 10*3/MM3 (ref 0–0.4)
EOSINOPHIL NFR BLD AUTO: 1.6 % (ref 0.3–6.2)
ERYTHROCYTE [DISTWIDTH] IN BLOOD BY AUTOMATED COUNT: 12.3 % (ref 12.3–15.4)
GLOBULIN UR ELPH-MCNC: 2.6 GM/DL
GLUCOSE SERPL-MCNC: 130 MG/DL (ref 65–99)
HCT VFR BLD AUTO: 36.4 % (ref 34–46.6)
HGB BLD-MCNC: 11.5 G/DL (ref 12–15.9)
IMM GRANULOCYTES # BLD AUTO: 0.03 10*3/MM3 (ref 0–0.05)
IMM GRANULOCYTES NFR BLD AUTO: 0.3 % (ref 0–0.5)
LYMPHOCYTES # BLD AUTO: 0.96 10*3/MM3 (ref 0.7–3.1)
LYMPHOCYTES NFR BLD AUTO: 10.2 % (ref 19.6–45.3)
MCH RBC QN AUTO: 29.7 PG (ref 26.6–33)
MCHC RBC AUTO-ENTMCNC: 31.6 G/DL (ref 31.5–35.7)
MCV RBC AUTO: 94.1 FL (ref 79–97)
MONOCYTES # BLD AUTO: 0.76 10*3/MM3 (ref 0.1–0.9)
MONOCYTES NFR BLD AUTO: 8 % (ref 5–12)
NEUTROPHILS NFR BLD AUTO: 7.51 10*3/MM3 (ref 1.7–7)
NEUTROPHILS NFR BLD AUTO: 79.5 % (ref 42.7–76)
NRBC BLD AUTO-RTO: 0 /100 WBC (ref 0–0.2)
PLATELET # BLD AUTO: 223 10*3/MM3 (ref 140–450)
PMV BLD AUTO: 11.1 FL (ref 6–12)
POTASSIUM SERPL-SCNC: 3.7 MMOL/L (ref 3.5–5.2)
PROT SERPL-MCNC: 6.5 G/DL (ref 6–8.5)
RBC # BLD AUTO: 3.87 10*6/MM3 (ref 3.77–5.28)
SODIUM SERPL-SCNC: 138 MMOL/L (ref 136–145)
TROPONIN T SERPL HS-MCNC: 7 NG/L
WBC NRBC COR # BLD: 9.45 10*3/MM3 (ref 3.4–10.8)

## 2023-08-26 PROCEDURE — 85025 COMPLETE CBC W/AUTO DIFF WBC: CPT | Performed by: FAMILY MEDICINE

## 2023-08-26 PROCEDURE — 80053 COMPREHEN METABOLIC PANEL: CPT | Performed by: FAMILY MEDICINE

## 2023-08-26 PROCEDURE — 72192 CT PELVIS W/O DYE: CPT

## 2023-08-26 PROCEDURE — 25010000002 ONDANSETRON PER 1 MG: Performed by: FAMILY MEDICINE

## 2023-08-26 PROCEDURE — 25010000002 MORPHINE PER 10 MG: Performed by: FAMILY MEDICINE

## 2023-08-26 PROCEDURE — 99284 EMERGENCY DEPT VISIT MOD MDM: CPT

## 2023-08-26 PROCEDURE — 85379 FIBRIN DEGRADATION QUANT: CPT | Performed by: FAMILY MEDICINE

## 2023-08-26 PROCEDURE — 96374 THER/PROPH/DIAG INJ IV PUSH: CPT

## 2023-08-26 PROCEDURE — 84484 ASSAY OF TROPONIN QUANT: CPT | Performed by: FAMILY MEDICINE

## 2023-08-26 PROCEDURE — 93005 ELECTROCARDIOGRAM TRACING: CPT | Performed by: FAMILY MEDICINE

## 2023-08-26 PROCEDURE — 96375 TX/PRO/DX INJ NEW DRUG ADDON: CPT

## 2023-08-26 PROCEDURE — 82550 ASSAY OF CK (CPK): CPT | Performed by: FAMILY MEDICINE

## 2023-08-26 RX ORDER — TRAMADOL HYDROCHLORIDE 50 MG/1
50 TABLET ORAL EVERY 12 HOURS PRN
Qty: 6 TABLET | Refills: 0 | Status: SHIPPED | OUTPATIENT
Start: 2023-08-26 | End: 2023-08-29

## 2023-08-26 RX ORDER — TRAMADOL HYDROCHLORIDE 50 MG/1
50 TABLET ORAL ONCE
Status: DISCONTINUED | OUTPATIENT
Start: 2023-08-27 | End: 2023-08-26 | Stop reason: HOSPADM

## 2023-08-26 RX ORDER — MORPHINE SULFATE 2 MG/ML
2 INJECTION, SOLUTION INTRAMUSCULAR; INTRAVENOUS ONCE
Status: COMPLETED | OUTPATIENT
Start: 2023-08-26 | End: 2023-08-26

## 2023-08-26 RX ORDER — SODIUM CHLORIDE 0.9 % (FLUSH) 0.9 %
10 SYRINGE (ML) INJECTION AS NEEDED
Status: DISCONTINUED | OUTPATIENT
Start: 2023-08-26 | End: 2023-08-26 | Stop reason: HOSPADM

## 2023-08-26 RX ORDER — ONDANSETRON 2 MG/ML
4 INJECTION INTRAMUSCULAR; INTRAVENOUS ONCE
Status: COMPLETED | OUTPATIENT
Start: 2023-08-26 | End: 2023-08-26

## 2023-08-26 RX ADMIN — ONDANSETRON 4 MG: 2 INJECTION INTRAMUSCULAR; INTRAVENOUS at 08:34

## 2023-08-26 RX ADMIN — MORPHINE SULFATE 2 MG: 2 INJECTION, SOLUTION INTRAMUSCULAR; INTRAVENOUS at 08:34

## 2023-08-26 NOTE — ED NOTES
The following fall interventions were initiated:    [x] Patient and/or family given education   [x] Call light within reach and educated on how to use   [x] Bed rails up per protocol    [x] Bed locked and in the lowest position   [x] Bed alarm set and on loudest setting   [x] Fall wrist band applied   [] Non skid footwear applied   [x] Room free of clutter   [x] Patient items within reach   [x] Adequate lighting provided  [x] Falls sign present    [] Patient moved closer to nursing station   [] Restraints applied

## 2023-08-26 NOTE — ED PROVIDER NOTES
"HPI:    Patient is a 77-year-old white female presents to the emergency room with a complaint of having a left hip and groin pain this been present for 10 days.  Patient denies any fall or injury such as that.  Patient states she had been doing a lot of walking and would feel a \"catch\" in her left hip and groin.  Patient would have to stop for a second and then resume walking.  Patient states that in the last 2 days she started having a lot worsening discomfort to the point of difficulty getting up and standing and walking.  Patient also this morning when the pain had started feeling very lightheaded and dizzy and felt like she was \"short of breath\".  This lasted about 15 minutes.  This prompted the patient to come into the emergency room due to this dizzy lightheaded spell as well as the continued worsening left hip discomfort without trauma.  Patient rates the pain with weightbearing in the left hip and groin area 8 out of 10.  There is no past history of blood clots or recent hip injury.  Patient denies any current chest pain shortness of breath or diaphoresis at this time.  And patient is dizziness has resolved.  Only pain that remains is the sharp left groin and hip discomfort.    REVIEW OF SYSTEMS  CONSTITUTIONAL:  No complaints of fever, chills,or weakness  EYES:  No complaints of discharge   ENT: No complaints of sore throat or ear pain  CARDIOVASCULAR:  No complaints of chest pain, palpitations, or swelling  RESPIRATORY:  No complaints of cough or shortness of breath  GI:  No complaints of abdominal pain, nausea, vomiting, or diarrhea  MUSCULOSKELETAL: Positive for left hip and groin discomfort.  SKIN:  No complaints of rash  NEUROLOGIC:  No complaints of headache, focal weakness, or sensory changes  ENDOCRINE:  No complaints of polyuria or polydipsia  LYMPHATIC:  No complaints of swollen glands  GENITOURINARY: No complaints of urinary frequency or hematuria        PAST MEDICAL HISTORY  Past Medical " History:   Diagnosis Date    Arthritis     Cancer     skin ca    Constipation     Gout     Hyperlipidemia     Hypertension     Neoplasm of uncertain behavior     lower lip    Plantar fasciitis     Sleep apnea     cpap at hs       FAMILY HISTORY  Family History   Problem Relation Age of Onset    Diabetes Father     Cancer Father     Prostate cancer Father     Diabetes Brother     Diabetes Maternal Grandmother     Breast cancer Niece     Colon cancer Neg Hx     Colon polyps Neg Hx     Esophageal cancer Neg Hx        SOCIAL HISTORY  Social History     Socioeconomic History    Marital status:    Tobacco Use    Smoking status: Never    Smokeless tobacco: Never   Vaping Use    Vaping Use: Never used   Substance and Sexual Activity    Alcohol use: Yes     Comment: rare    Drug use: Never    Sexual activity: Never       IMMUNIZATION HISTORY  Deferred to primary care physician.    SURGICAL HISTORY  Past Surgical History:   Procedure Laterality Date    BACK SURGERY      BLADDER SUSPENSION      CERVICAL CONE BIOPSY      COLONOSCOPY N/A 12/09/2021    Procedure: COLONOSCOPY WITH ANESTHESIA;  Surgeon: Dale Joyce DO;  Location: [facility] ENDOSCOPY;  Service: Gastroenterology;  Laterality: N/A;  pre screen  post diverticulosis  Francisco Barrios MD    EYE SURGERY Left     basal cell carcinoma     GANGLION CYST EXCISION      HEMORROIDECTOMY      HYSTERECTOMY      Abdominal hysterectomy bilateral salpingectomy-pt voices she only has 1 ovary but doesnt know which one       CURRENT MEDICATIONS    Current Facility-Administered Medications:     [COMPLETED] Insert Peripheral IV, , , Once **AND** sodium chloride 0.9 % flush 10 mL, 10 mL, Intravenous, PRN, Vinicio Boss Jr., MD    [START ON 8/27/2023] traMADol (ULTRAM) tablet 50 mg, 50 mg, Oral, Once, Vinicio Boss Jr., MD    Current Outpatient Medications:     Aspirin Buf,CaCarb-MgCarb-MgO, 81 MG tablet, Take 81 mg by mouth., Disp: , Rfl:     atenolol (TENORMIN) 25  MG tablet, 1 tablet. daily, Disp: , Rfl:     calcium carbonate (OS-SAMANTA) 600 MG tablet, Take  by mouth., Disp: , Rfl:     cholecalciferol (VITAMIN D3) 25 MCG (1000 UT) tablet, Take 1 tablet by mouth Daily. (Patient not taking: Reported on 8/17/2023), Disp: , Rfl:     clobetasol (Temovate) 0.05 % cream, Apply  topically to the appropriate area as directed 2 (Two) Times a Day., Disp: 45 g, Rfl: 3    estradiol (ESTRACE) 0.1 MG/GM vaginal cream, Insert 2 g into the vagina 2 (Two) Times a Week., Disp: 42.5 g, Rfl: 12    indomethacin (INDOCIN) 50 MG capsule, Take 1 capsule by mouth., Disp: , Rfl:     losartan-hydrochlorothiazide (HYZAAR) 50-12.5 MG per tablet, , Disp: , Rfl:     meclizine 25 MG chewable tablet chewable tablet, Chew 1 tablet., Disp: , Rfl:     Mirabegron ER (Myrbetriq) 25 MG tablet sustained-release 24 hour 24 hr tablet, Take 1 tablet by mouth Daily., Disp: 90 tablet, Rfl: 3    Multiple Vitamins-Minerals (MULTIVITAMIN AND MINERALS) liquid liquid, Take  by mouth Daily., Disp: , Rfl:     nitrofurantoin, macrocrystal-monohydrate, (MACROBID) 100 MG capsule, Take 1 capsule by mouth 2 (Two) Times a Day., Disp: 14 capsule, Rfl: 0    phenazopyridine (PYRIDIUM) 100 MG tablet, TAKE 1 TABLET THREE TIMES A DAY AS NEEDED FOR BLADDER SPASMS, Disp: 20 tablet, Rfl: 51    Polyethylene Glycol 3350 (CLEARLAX PO), Take  by mouth., Disp: , Rfl:     simvastatin (ZOCOR) 20 MG tablet, Take 1 tablet by mouth., Disp: , Rfl:     traMADol (ULTRAM) 50 MG tablet, Take 1 tablet by mouth Every 12 (Twelve) Hours As Needed for Moderate Pain for up to 3 days., Disp: 6 tablet, Rfl: 0    triamterene-hydrochlorothiazide (DYAZIDE) 37.5-25 MG per capsule, Take  by mouth. (Patient not taking: Reported on 8/17/2023), Disp: , Rfl:     ALLERGIES  Allergies   Allergen Reactions    Ciprofibrate Nausea And Vomiting    Sulfa Antibiotics Rash       Musculoskeletal exam    VITAL SIGNS:   /61   Pulse 60   Temp 97.6 øF (36.4 øC)   Resp 18   Ht  "157.5 cm (62.01\")   Wt 71.7 kg (158 lb)   SpO2 97%   BMI 28.89 kg/mý     Constitutional: Patient is alert and in no distress.  Patient with moderate left hip and groin discomfort.    ENT: There is a normal pharynx with no acute erythema or exudate and oral mucosa is moist.  Nose is clear with no drainage.  Tympanic membranes intact and non-erythemic    Cardiovascular: S1-S2 regular rate and rhythm no murmurs rubs or gallops is noted.    Respiratory: Patient is clear to auscultation bilaterally with no wheezing or rhonchi.  Chest wall is nontender.  There is no external lesions on the chest.  There is no crepitance    Abdomen: Soft nontender bowel sounds are normal in all 4 quadrants there is no rebound or guarding noted.  There is no abdominal distention or hepatosplenomegaly.    Musculoskeletal: Left hip/pelvis: There is tenderness to palpation in the left groin area.  The pain is worsened with external greater than internal rotation.  There is no leg length discrepancy.  There is no tenderness from the upper thigh down to the left foot there is no pain or tenderness on the right hip or groin area.  Or no pain in the right lower extremity.    Integumentary: No acute changes noted    Genitourinary: Patient is voiding appropriately.    Psychiatric: Normal affect and mood      RADIOLOGY/PROCEDURES        CT Pelvis Without Contrast   Final Result          No fracture.       Moderate stool burden.       Colonic diverticulosis.           This report was finalized on 08/26/2023 08:57 by  Mendez Alford DO.             FUTURE APPOINTMENTS     Future Appointments   Date Time Provider Department Center   10/31/2023 10:00 AM Kelly Milian MD [facility]   2/19/2024  1:15 PM Cammy Hendrix APRN [facility]                COURSE & MEDICAL DECISION MAKING    Patient's partial differential diagnosis can include: Some differential diagnosis can include stress fracture of the hip, left groin DVT, left groin " strain, dizziness, vasovagal near syncope, and other       Patient's pain is improved at the of the morphine and Zofran.  CT scan of the patient's lower pelvis shows no fracture or dislocation or subluxation.  Patient does show a moderate stool burden.  Overall labs appear to be stable.  Patient has an appointment with orthopedics explained to the patient the need to follow-up with orthopedics.      Patient's level of risk: Low      CRITICAL CARE    CRITICAL CARE: No    CRITICAL CARE TIME: None      Recent Results (from the past 24 hour(s))   Comprehensive Metabolic Panel    Collection Time: 08/26/23  8:33 AM    Specimen: Blood   Result Value Ref Range    Glucose 130 (H) 65 - 99 mg/dL    BUN 16 8 - 23 mg/dL    Creatinine 0.64 0.57 - 1.00 mg/dL    Sodium 138 136 - 145 mmol/L    Potassium 3.7 3.5 - 5.2 mmol/L    Chloride 101 98 - 107 mmol/L    CO2 27.0 22.0 - 29.0 mmol/L    Calcium 9.0 8.6 - 10.5 mg/dL    Total Protein 6.5 6.0 - 8.5 g/dL    Albumin 3.9 3.5 - 5.2 g/dL    ALT (SGPT) 17 1 - 33 U/L    AST (SGOT) 22 1 - 32 U/L    Alkaline Phosphatase 93 39 - 117 U/L    Total Bilirubin 0.5 0.0 - 1.2 mg/dL    Globulin 2.6 gm/dL    A/G Ratio 1.5 g/dL    BUN/Creatinine Ratio 25.0 7.0 - 25.0    Anion Gap 10.0 5.0 - 15.0 mmol/L    eGFR 92.3 >60.0 mL/min/1.73   CK    Collection Time: 08/26/23  8:33 AM    Specimen: Blood   Result Value Ref Range    Creatine Kinase 32 20 - 180 U/L   Single High Sensitivity Troponin T    Collection Time: 08/26/23  8:33 AM    Specimen: Blood   Result Value Ref Range    HS Troponin T 7 <10 ng/L   D-dimer, Quantitative    Collection Time: 08/26/23  8:33 AM    Specimen: Blood   Result Value Ref Range    D-Dimer, Quantitative 0.43 0.00 - 0.75 MCGFEU/mL   CBC Auto Differential    Collection Time: 08/26/23  8:33 AM    Specimen: Blood   Result Value Ref Range    WBC 9.45 3.40 - 10.80 10*3/mm3    RBC 3.87 3.77 - 5.28 10*6/mm3    Hemoglobin 11.5 (L) 12.0 - 15.9 g/dL    Hematocrit 36.4 34.0 - 46.6 %    MCV  94.1 79.0 - 97.0 fL    MCH 29.7 26.6 - 33.0 pg    MCHC 31.6 31.5 - 35.7 g/dL    RDW 12.3 12.3 - 15.4 %    RDW-SD 42.8 37.0 - 54.0 fl    MPV 11.1 6.0 - 12.0 fL    Platelets 223 140 - 450 10*3/mm3    Neutrophil % 79.5 (H) 42.7 - 76.0 %    Lymphocyte % 10.2 (L) 19.6 - 45.3 %    Monocyte % 8.0 5.0 - 12.0 %    Eosinophil % 1.6 0.3 - 6.2 %    Basophil % 0.4 0.0 - 1.5 %    Immature Grans % 0.3 0.0 - 0.5 %    Neutrophils, Absolute 7.51 (H) 1.70 - 7.00 10*3/mm3    Lymphocytes, Absolute 0.96 0.70 - 3.10 10*3/mm3    Monocytes, Absolute 0.76 0.10 - 0.90 10*3/mm3    Eosinophils, Absolute 0.15 0.00 - 0.40 10*3/mm3    Basophils, Absolute 0.04 0.00 - 0.20 10*3/mm3    Immature Grans, Absolute 0.03 0.00 - 0.05 10*3/mm3    nRBC 0.0 0.0 - 0.2 /100 WBC   ECG 12 Lead Other; Dizziness    Collection Time: 08/26/23  9:20 AM   Result Value Ref Range    QT Interval 386 ms    QTC Interval 382 ms           Also  Old charts were reviewed per [facility] EMR.  Pertinent details are summarized above.  All laboratory, radiologic, and EKG studies that were performed in the Emergency Department were a necessary part of the evaluation needed to exclude unstable or  emergent medical conditions.     Patient was hemodynamically and neurologically stable in the ED.   Pertinent studies were reviewed as above.     The patient received:  Medications   sodium chloride 0.9 % flush 10 mL (has no administration in time range)   traMADol (ULTRAM) tablet 50 mg (has no administration in time range)   Morphine sulfate (PF) injection 2 mg (2 mg Intravenous Given 8/26/23 0834)   ondansetron (ZOFRAN) injection 4 mg (4 mg Intravenous Given 8/26/23 0834)            ED Disposition       ED Disposition   Discharge    Condition   Stable    Comment   --                 I have reviewed the patient's prescription history via a prescription monitoring program.  This information is consistent with my knowledge of the patient's controlled substance use history.    Patient evaluate  during Coronavirus Pandemic. Isolation practices followed according to [facility] policy.    FINAL IMPRESSION   Diagnosis Plan   1. Strain of left groin  traMADol (ULTRAM) 50 MG tablet      2. Left hip pain  traMADol (ULTRAM) 50 MG tablet      3. Constipation, unspecified constipation type              MD Gera Montero Jr, Thomas Mark Jr., MD  08/26/23 [id number]

## 2023-08-27 LAB
QT INTERVAL: 386 MS
QTC INTERVAL: 382 MS

## 2023-09-13 ENCOUNTER — TELEPHONE (OUTPATIENT)
Dept: UROLOGY | Facility: CLINIC | Age: 78
End: 2023-09-13
Payer: OTHER GOVERNMENT

## 2023-09-13 NOTE — TELEPHONE ENCOUNTER
Spoke with patient and let her know we could not send in a antibiotic without seeing the patient and running a UA or culture on the urine so I got patient in to see next available tomorrow 9/14/2023.

## 2023-09-13 NOTE — TELEPHONE ENCOUNTER
Provider: IRENA FLORES    Caller: WILIAN MCCRARY    Relationship to Patient: SELF    Pharmacy: [facility]    Phone Number: [phone]    Reason for Call: PT FEELS SHE HAS UTI AGAIN, PAINFUL URINATION.  PT HAS REQUESTED FOR MEDICATION TO BE CALLED IN.  MEDICATION PRESCRIBED ON 8/20/23 NITROFURANTOIN, MACROCRYSTAL-MONOHYDRATE HELPED CLEAR UP UTI QUICKLY.    When was the patient last seen: 8/17/23    PLEASE CALL PT TO CONFIRM IF MEDICATION CAN BE CALLED IN.

## 2023-09-14 ENCOUNTER — OFFICE VISIT (OUTPATIENT)
Dept: UROLOGY | Facility: CLINIC | Age: 78
End: 2023-09-14
Payer: MEDICARE

## 2023-09-14 VITALS — WEIGHT: 159 LBS | HEIGHT: 62 IN | TEMPERATURE: 97 F | BODY MASS INDEX: 29.26 KG/M2

## 2023-09-14 DIAGNOSIS — N39.0 URINARY TRACT INFECTION WITHOUT HEMATURIA, SITE UNSPECIFIED: ICD-10-CM

## 2023-09-14 DIAGNOSIS — N39.0 RECURRENT UTI: Primary | ICD-10-CM

## 2023-09-14 LAB
BILIRUB BLD-MCNC: NEGATIVE MG/DL
CLARITY, POC: ABNORMAL
COLOR UR: YELLOW
GLUCOSE UR STRIP-MCNC: NEGATIVE MG/DL
KETONES UR QL: NEGATIVE
LEUKOCYTE EST, POC: ABNORMAL
NITRITE UR-MCNC: NEGATIVE MG/ML
PH UR: 7 [PH] (ref 5–8)
PROT UR STRIP-MCNC: NEGATIVE MG/DL
RBC # UR STRIP: ABNORMAL /UL
SP GR UR: 1.01 (ref 1–1.03)
UROBILINOGEN UR QL: ABNORMAL

## 2023-09-14 PROCEDURE — 87077 CULTURE AEROBIC IDENTIFY: CPT | Performed by: PHYSICIAN ASSISTANT

## 2023-09-14 PROCEDURE — 87186 SC STD MICRODIL/AGAR DIL: CPT | Performed by: PHYSICIAN ASSISTANT

## 2023-09-14 PROCEDURE — 87086 URINE CULTURE/COLONY COUNT: CPT | Performed by: PHYSICIAN ASSISTANT

## 2023-09-14 RX ORDER — AMPICILLIN 500 MG/1
500 CAPSULE ORAL 4 TIMES DAILY
Qty: 28 CAPSULE | Refills: 0 | Status: SHIPPED | OUTPATIENT
Start: 2023-09-14 | End: 2023-09-21

## 2023-09-14 NOTE — PROGRESS NOTES
Subjective    Ms. Andrew is 77 y.o. female    Chief Complaint: Recurrent UTI    History of Present Illness  Patient with history of recurrent UTIs and overactive bladder symptoms she has had good response to estrogen cream and does take Myrbetriq for her overactive bladder symptoms.  Since she had seen Cammy initially July 2022 she had not had an infection since recently when she was treated last month for Enterococcus UTI.  Her symptoms resolved and the past few days she has had increased urgency and burning with urination.  She denies any fever chills or flank pain.    The following portions of the patient's history were reviewed and updated as appropriate: allergies, current medications, past family history, past medical history, past social history, past surgical history and problem list.    Review of Systems   Constitutional:  Negative for chills and fever.   Gastrointestinal:  Negative for abdominal pain, anal bleeding and blood in stool.   Genitourinary:  Positive for dysuria and frequency. Negative for hematuria.       Current Outpatient Medications:     Aspirin Buf,CaCarb-MgCarb-MgO, 81 MG tablet, Take 81 mg by mouth., Disp: , Rfl:     atenolol (TENORMIN) 25 MG tablet, 1 tablet. daily, Disp: , Rfl:     calcium carbonate (OS-SAMANTA) 600 MG tablet, Take  by mouth., Disp: , Rfl:     clobetasol (Temovate) 0.05 % cream, Apply  topically to the appropriate area as directed 2 (Two) Times a Day., Disp: 45 g, Rfl: 3    estradiol (ESTRACE) 0.1 MG/GM vaginal cream, Insert 2 g into the vagina 2 (Two) Times a Week., Disp: 42.5 g, Rfl: 12    indomethacin (INDOCIN) 50 MG capsule, Take 1 capsule by mouth., Disp: , Rfl:     losartan-hydrochlorothiazide (HYZAAR) 50-12.5 MG per tablet, , Disp: , Rfl:     meclizine 25 MG chewable tablet chewable tablet, Chew 1 tablet., Disp: , Rfl:     Mirabegron ER (Myrbetriq) 25 MG tablet sustained-release 24 hour 24 hr tablet, Take 1 tablet by mouth Daily., Disp: 90 tablet, Rfl: 3    Multiple  Vitamins-Minerals (MULTIVITAMIN AND MINERALS) liquid liquid, Take  by mouth Daily., Disp: , Rfl:     nitrofurantoin, macrocrystal-monohydrate, (MACROBID) 100 MG capsule, Take 1 capsule by mouth 2 (Two) Times a Day., Disp: 14 capsule, Rfl: 0    phenazopyridine (PYRIDIUM) 100 MG tablet, TAKE 1 TABLET THREE TIMES A DAY AS NEEDED FOR BLADDER SPASMS, Disp: 20 tablet, Rfl: 51    Polyethylene Glycol 3350 (CLEARLAX PO), Take  by mouth., Disp: , Rfl:     simvastatin (ZOCOR) 20 MG tablet, Take 1 tablet by mouth., Disp: , Rfl:     ampicillin (PRINCIPEN) 500 MG capsule, Take 1 capsule by mouth 4 (Four) Times a Day for 7 days., Disp: 28 capsule, Rfl: 0    cholecalciferol (VITAMIN D3) 25 MCG (1000 UT) tablet, Take 1 tablet by mouth Daily. (Patient not taking: Reported on 8/17/2023), Disp: , Rfl:     triamterene-hydrochlorothiazide (DYAZIDE) 37.5-25 MG per capsule, Take  by mouth. (Patient not taking: Reported on 8/17/2023), Disp: , Rfl:     Past Medical History:   Diagnosis Date    Arthritis     Cancer     skin ca    Constipation     Gout     Hyperlipidemia     Hypertension     Neoplasm of uncertain behavior     lower lip    Plantar fasciitis     Sleep apnea     cpap at hs       Past Surgical History:   Procedure Laterality Date    BACK SURGERY      BLADDER SUSPENSION      CERVICAL CONE BIOPSY      COLONOSCOPY N/A 12/09/2021    Procedure: COLONOSCOPY WITH ANESTHESIA;  Surgeon: Dale Joyce DO;  Location: [facility] ENDOSCOPY;  Service: Gastroenterology;  Laterality: N/A;  pre screen  post diverticulosis  Francisco Barrios MD    EYE SURGERY Left     basal cell carcinoma     GANGLION CYST EXCISION      HEMORROIDECTOMY      HYSTERECTOMY      Abdominal hysterectomy bilateral salpingectomy-pt voices she only has 1 ovary but doesnt know which one       Social History     Socioeconomic History    Marital status:    Tobacco Use    Smoking status: Never    Smokeless tobacco: Never   Vaping Use    Vaping Use: Never used   Substance  "and Sexual Activity    Alcohol use: Yes     Comment: rare    Drug use: Never    Sexual activity: Never       Family History   Problem Relation Age of Onset    Diabetes Father     Cancer Father     Prostate cancer Father     Diabetes Brother     Diabetes Maternal Grandmother     Breast cancer Niece     Colon cancer Neg Hx     Colon polyps Neg Hx     Esophageal cancer Neg Hx        Objective    Temp 97 °F (36.1 °C)   Ht 157.5 cm (62\")   Wt 72.1 kg (159 lb)   BMI 29.08 kg/m²     Physical Exam  Vitals reviewed.   Constitutional:       General: She is not in acute distress.     Appearance: Normal appearance. She is not toxic-appearing.   HENT:      Head: Normocephalic and atraumatic.   Pulmonary:      Effort: Pulmonary effort is normal.   Skin:     Coloration: Skin is not pale.   Neurological:      Mental Status: She is alert.   Psychiatric:         Mood and Affect: Mood normal.         Behavior: Behavior normal.           Results for orders placed or performed in visit on 09/14/23   POC Urinalysis Dipstick, Multipro    Specimen: Urine   Result Value Ref Range    Color Yellow Yellow, Straw, Dark Yellow, Kelly    Clarity, UA Slightly Cloudy (A) Clear    Glucose, UA Negative Negative mg/dL    Bilirubin Negative Negative    Ketones, UA Negative Negative    Specific Gravity  1.015 1.005 - 1.030    Blood, UA Trace (A) Negative    pH, Urine 7.0 5.0 - 8.0    Protein, POC Negative Negative mg/dL    Urobilinogen, UA 0.2 E.U./dL Normal, 0.2 E.U./dL    Nitrite, UA Negative Negative    Leukocytes Large (3+) (A) Negative   I performed microscopic evaluation of the patient's urine too numerous to count leukocytes.  Assessment and Plan    Diagnoses and all orders for this visit:    1. Recurrent UTI (Primary)  -     POC Urinalysis Dipstick, Multipro  -     Urine Culture - Urine, Urine, Random Void  -     ampicillin (PRINCIPEN) 500 MG capsule; Take 1 capsule by mouth 4 (Four) Times a Day for 7 days.  Dispense: 28 capsule; Refill: " 0    2. Urinary tract infection without hematuria, site unspecified  -     ampicillin (PRINCIPEN) 500 MG capsule; Take 1 capsule by mouth 4 (Four) Times a Day for 7 days.  Dispense: 28 capsule; Refill: 0  Patient treated last month for culture positive UTI that grew Enterococcus she was treated with Macrobid she had symptom resolution and then the last few days she has had burning and frequency overall she is had excellent response since she started the estrogen cream regimen. Due to the fact she is symptomatic I want to treat her empirically I will treat her with ampicillin based on last culture result in August.  We will contact her regarding her culture results if we need to switch her to a different antibiotic we will.  Plan on follow-up 2 to 3 weeks to recheck urine and assess resolution of UTI.

## 2023-09-16 LAB — BACTERIA SPEC AEROBE CULT: ABNORMAL

## 2023-09-18 ENCOUNTER — TELEPHONE (OUTPATIENT)
Dept: UROLOGY | Facility: CLINIC | Age: 78
End: 2023-09-18
Payer: OTHER GOVERNMENT

## 2023-09-18 NOTE — TELEPHONE ENCOUNTER
Called pt with information- she voiced understanding     ----- Message from MAYA Mazariegos sent at 9/18/2023  8:07 AM CDT -----  Regarding: urine culture  Continue the antibiotic prescribed  ----- Message -----  From: Anni Johnson MA  Sent: 9/14/2023  10:50 AM CDT  To: MAYA Mazariegos

## 2023-09-19 NOTE — PROGRESS NOTES
Subjective    Ms. Andrew is 77 y.o. female    Chief Complaint: Recurrent UTI     History of Present Illness  Patient with history of recurrent UTIs I saw her 09/14/2023 she is also on Myrbetriq for overactive bladder symptoms this has been effective.  She had a recent UTI with Enterococcus culture when I saw her 09 her culture was positive for E. coli I treated her with ampicillin.  She feels better she feels like her infection is resolved her urine is completely clear.  She is also taking her estrogen cream.    The following portions of the patient's history were reviewed and updated as appropriate: allergies, current medications, past family history, past medical history, past social history, past surgical history and problem list.    Review of Systems   Constitutional:  Negative for chills and fever.   Gastrointestinal:  Negative for abdominal pain, anal bleeding and blood in stool.   Genitourinary:  Negative for dysuria and hematuria.       Current Outpatient Medications:     Aspirin Buf,CaCarb-MgCarb-MgO, 81 MG tablet, Take 81 mg by mouth., Disp: , Rfl:     atenolol (TENORMIN) 25 MG tablet, 1 tablet. daily, Disp: , Rfl:     calcium carbonate (OS-SAMANTA) 600 MG tablet, Take  by mouth., Disp: , Rfl:     clobetasol (Temovate) 0.05 % cream, Apply  topically to the appropriate area as directed 2 (Two) Times a Day., Disp: 45 g, Rfl: 3    estradiol (ESTRACE) 0.1 MG/GM vaginal cream, Insert 2 g into the vagina 2 (Two) Times a Week., Disp: 42.5 g, Rfl: 12    indomethacin (INDOCIN) 50 MG capsule, Take 1 capsule by mouth., Disp: , Rfl:     losartan-hydrochlorothiazide (HYZAAR) 50-12.5 MG per tablet, , Disp: , Rfl:     meclizine 25 MG chewable tablet chewable tablet, Chew 1 tablet., Disp: , Rfl:     Mirabegron ER (Myrbetriq) 25 MG tablet sustained-release 24 hour 24 hr tablet, Take 1 tablet by mouth Daily., Disp: 90 tablet, Rfl: 3    Multiple Vitamins-Minerals (MULTIVITAMIN AND MINERALS) liquid liquid, Take  by mouth Daily.,  Disp: , Rfl:     nitrofurantoin, macrocrystal-monohydrate, (MACROBID) 100 MG capsule, Take 1 capsule by mouth 2 (Two) Times a Day., Disp: 14 capsule, Rfl: 0    phenazopyridine (PYRIDIUM) 100 MG tablet, TAKE 1 TABLET THREE TIMES A DAY AS NEEDED FOR BLADDER SPASMS, Disp: 20 tablet, Rfl: 51    Polyethylene Glycol 3350 (CLEARLAX PO), Take  by mouth., Disp: , Rfl:     simvastatin (ZOCOR) 20 MG tablet, Take 1 tablet by mouth., Disp: , Rfl:     cholecalciferol (VITAMIN D3) 25 MCG (1000 UT) tablet, Take 1 tablet by mouth Daily. (Patient not taking: Reported on 8/17/2023), Disp: , Rfl:     triamterene-hydrochlorothiazide (DYAZIDE) 37.5-25 MG per capsule, Take  by mouth. (Patient not taking: Reported on 8/17/2023), Disp: , Rfl:     Past Medical History:   Diagnosis Date    Arthritis     Cancer     skin ca    Constipation     Gout     Hyperlipidemia     Hypertension     Neoplasm of uncertain behavior     lower lip    Plantar fasciitis     Sleep apnea     cpap at hs       Past Surgical History:   Procedure Laterality Date    BACK SURGERY      BLADDER SUSPENSION      CERVICAL CONE BIOPSY      COLONOSCOPY N/A 12/09/2021    Procedure: COLONOSCOPY WITH ANESTHESIA;  Surgeon: Dale Joyce DO;  Location: [facility] ENDOSCOPY;  Service: Gastroenterology;  Laterality: N/A;  pre screen  post diverticulosis  Francisco Barrios MD    EYE SURGERY Left     basal cell carcinoma     GANGLION CYST EXCISION      HEMORROIDECTOMY      HYSTERECTOMY      Abdominal hysterectomy bilateral salpingectomy-pt voices she only has 1 ovary but doesnt know which one       Social History     Socioeconomic History    Marital status:    Tobacco Use    Smoking status: Never    Smokeless tobacco: Never   Vaping Use    Vaping Use: Never used   Substance and Sexual Activity    Alcohol use: Yes     Comment: rare    Drug use: Never    Sexual activity: Never       Family History   Problem Relation Age of Onset    Diabetes Father     Cancer Father     Prostate  cancer Father     Diabetes Brother     Diabetes Maternal Grandmother     Breast cancer Niece     Colon cancer Neg Hx     Colon polyps Neg Hx     Esophageal cancer Neg Hx        Objective    There were no vitals taken for this visit.    Physical Exam  Vitals reviewed.   Constitutional:       Appearance: Normal appearance.   HENT:      Head: Normocephalic and atraumatic.   Pulmonary:      Effort: Pulmonary effort is normal.   Skin:     Coloration: Skin is not pale.   Neurological:      Mental Status: She is alert.   Psychiatric:         Mood and Affect: Mood normal.         Behavior: Behavior normal.           Results for orders placed or performed in visit on 09/28/23   POC Urinalysis Dipstick, Multipro    Specimen: Urine   Result Value Ref Range    Color Yellow Yellow, Straw, Dark Yellow, Kelly    Clarity, UA Clear Clear    Glucose, UA Negative Negative mg/dL    Bilirubin Negative Negative    Ketones, UA Negative Negative    Specific Gravity  1.020 1.005 - 1.030    Blood, UA Negative Negative    pH, Urine 7.5 5.0 - 8.0    Protein, POC Negative Negative mg/dL    Urobilinogen, UA 0.2 E.U./dL Normal, 0.2 E.U./dL    Nitrite, UA Negative Negative    Leukocytes Negative Negative     Assessment and Plan    Diagnoses and all orders for this visit:    1. Recurrent UTI (Primary)  -     POC Urinalysis Dipstick, Multipro  -     Urine Culture - Urine, Urine, Random Void  Took course of ampicillin her urine is clearly clear indicating successful treatment of the infection I will send urine for culture and sensitivity to document resolution.  She continue the estrogen cream which she is using as directed.  I also recommend she start a supplement of cranberry with d-mannose 2 hydrating well with water she states she drinks 316 ounce bottles of water a day on average.  We will contact you regarding her culture results otherwise follow-up in 3 months.

## 2023-09-28 ENCOUNTER — OFFICE VISIT (OUTPATIENT)
Dept: UROLOGY | Facility: CLINIC | Age: 78
End: 2023-09-28
Payer: MEDICARE

## 2023-09-28 DIAGNOSIS — N39.0 RECURRENT UTI: Primary | ICD-10-CM

## 2023-09-28 LAB
BILIRUB BLD-MCNC: NEGATIVE MG/DL
CLARITY, POC: CLEAR
COLOR UR: YELLOW
GLUCOSE UR STRIP-MCNC: NEGATIVE MG/DL
KETONES UR QL: NEGATIVE
LEUKOCYTE EST, POC: NEGATIVE
NITRITE UR-MCNC: NEGATIVE MG/ML
PH UR: 7.5 [PH] (ref 5–8)
PROT UR STRIP-MCNC: NEGATIVE MG/DL
RBC # UR STRIP: NEGATIVE /UL
SP GR UR: 1.02 (ref 1–1.03)
UROBILINOGEN UR QL: NORMAL

## 2023-09-28 PROCEDURE — 87086 URINE CULTURE/COLONY COUNT: CPT | Performed by: PHYSICIAN ASSISTANT

## 2023-09-30 LAB — BACTERIA SPEC AEROBE CULT: NO GROWTH

## 2023-10-02 ENCOUNTER — TELEPHONE (OUTPATIENT)
Dept: UROLOGY | Facility: CLINIC | Age: 78
End: 2023-10-02
Payer: OTHER GOVERNMENT

## 2023-10-02 NOTE — TELEPHONE ENCOUNTER
----- Message from MAYA Mazariegos sent at 10/2/2023  7:57 AM CDT -----  Regarding: Urine culture  Urine culture was negative for bacterial growth.  ----- Message -----  From: Anni Johnson MA  Sent: 9/28/2023  10:06 AM CDT  To: MAYA Mazariegos

## 2023-11-20 ENCOUNTER — OFFICE VISIT (OUTPATIENT)
Dept: OBSTETRICS AND GYNECOLOGY | Facility: CLINIC | Age: 78
End: 2023-11-20
Payer: MEDICARE

## 2023-11-20 VITALS
SYSTOLIC BLOOD PRESSURE: 138 MMHG | BODY MASS INDEX: 29.26 KG/M2 | HEIGHT: 62 IN | DIASTOLIC BLOOD PRESSURE: 72 MMHG | WEIGHT: 159 LBS

## 2023-11-20 DIAGNOSIS — Z01.419 WELL WOMAN EXAM WITH ROUTINE GYNECOLOGICAL EXAM: Primary | ICD-10-CM

## 2023-11-20 DIAGNOSIS — Z12.31 SCREENING MAMMOGRAM FOR BREAST CANCER: ICD-10-CM

## 2023-11-20 DIAGNOSIS — L90.0 LICHEN SCLEROSUS: ICD-10-CM

## 2023-11-20 DIAGNOSIS — N39.0 RECURRENT UTI: ICD-10-CM

## 2023-11-20 DIAGNOSIS — N89.8 VAGINAL DRYNESS: ICD-10-CM

## 2023-11-20 RX ORDER — CLOBETASOL PROPIONATE 0.5 MG/G
CREAM TOPICAL 2 TIMES DAILY
Qty: 45 G | Refills: 3 | Status: SHIPPED | OUTPATIENT
Start: 2023-11-20

## 2023-11-20 RX ORDER — ESTRADIOL 0.1 MG/G
2 CREAM VAGINAL 2 TIMES WEEKLY
Qty: 42.5 G | Refills: 12 | Status: SHIPPED | OUTPATIENT
Start: 2023-11-20

## 2023-11-20 NOTE — PROGRESS NOTES
"Chief Complaint   Patient presents with    Gynecologic Exam     Patient here for annual, patient has had a hysterectomy, last mammogram 4/14/23, last dexa 11/9/20, last colonoscopy 12/9/21, patient denies any problems or concerns.         Subjective     Keren Andrew is a 78 y.o. female    History of Present Illness  Pt comes in today for annual wellness exam. Denies any problems. Stable on temovate and estrace cream.     /72 (BP Location: Left arm, Patient Position: Sitting, Cuff Size: Adult)   Ht 157.5 cm (62\")   Wt 72.1 kg (159 lb)   BMI 29.08 kg/m²     Outpatient Encounter Medications as of 11/20/2023   Medication Sig Dispense Refill    Aspirin Buf,CaCarb-MgCarb-MgO, 81 MG tablet Take 81 mg by mouth.      atenolol (TENORMIN) 25 MG tablet 1 tablet. daily      calcium carbonate (OS-SAMANTA) 600 MG tablet Take  by mouth.      cholecalciferol (VITAMIN D3) 25 MCG (1000 UT) tablet Take 1 tablet by mouth Daily.      clobetasol (Temovate) 0.05 % cream Apply  topically to the appropriate area as directed 2 (Two) Times a Day. 45 g 3    estradiol (ESTRACE) 0.1 MG/GM vaginal cream Insert 2 applicators into the vagina 2 (Two) Times a Week. 42.5 g 12    indomethacin (INDOCIN) 50 MG capsule Take 1 capsule by mouth.      losartan-hydrochlorothiazide (HYZAAR) 50-12.5 MG per tablet       meclizine 25 MG chewable tablet chewable tablet Chew 1 tablet.      Mirabegron ER (Myrbetriq) 25 MG tablet sustained-release 24 hour 24 hr tablet Take 1 tablet by mouth Daily. 90 tablet 3    Multiple Vitamins-Minerals (MULTIVITAMIN AND MINERALS) liquid liquid Take  by mouth Daily.      nitrofurantoin, macrocrystal-monohydrate, (MACROBID) 100 MG capsule Take 1 capsule by mouth 2 (Two) Times a Day. 14 capsule 0    phenazopyridine (PYRIDIUM) 100 MG tablet TAKE 1 TABLET THREE TIMES A DAY AS NEEDED FOR BLADDER SPASMS 20 tablet 51    Polyethylene Glycol 3350 (CLEARLAX PO) Take  by mouth.      simvastatin (ZOCOR) 20 MG tablet Take 1 tablet by " mouth.      triamterene-hydrochlorothiazide (DYAZIDE) 37.5-25 MG per capsule Take  by mouth.      [DISCONTINUED] clobetasol (Temovate) 0.05 % cream Apply  topically to the appropriate area as directed 2 (Two) Times a Day. 45 g 3    [DISCONTINUED] estradiol (ESTRACE) 0.1 MG/GM vaginal cream Insert 2 g into the vagina 2 (Two) Times a Week. 42.5 g 12     No facility-administered encounter medications on file as of 11/20/2023.       Past Medical History  Past Medical History:   Diagnosis Date    Arthritis     Cancer     Constipation     Gout     Hyperlipidemia     Hypertension     Neoplasm of uncertain behavior     Osteoporosis     Plantar fasciitis     Sleep apnea         Surgical History  Past Surgical History:   Procedure Laterality Date    BACK SURGERY      BLADDER SUSPENSION      CERVICAL CONE BIOPSY      COLONOSCOPY N/A 12/09/2021    Procedure: COLONOSCOPY WITH ANESTHESIA;  Surgeon: Dale Joyce DO;  Location: [facility] ENDOSCOPY;  Service: Gastroenterology;  Laterality: N/A;  pre screen  post diverticulosis  Francisco Barrios MD    EYE SURGERY Left     basal cell carcinoma     GANGLION CYST EXCISION      HEMORROIDECTOMY      HYSTERECTOMY      Abdominal hysterectomy bilateral salpingectomy-pt voices she only has 1 ovary but doesnt know which one       Family History  Family History   Problem Relation Age of Onset    Diabetes Father     Cancer Father     Prostate cancer Father     Diabetes Brother     Alzheimer's disease Sister     Diabetes Maternal Grandmother     Breast cancer Niece     Colon cancer Neg Hx     Colon polyps Neg Hx     Esophageal cancer Neg Hx        The following portions of the patient's history were reviewed and updated as appropriate: allergies, current medications, past family history, past medical history, past social history, past surgical history, and problem list.    Review of Systems   Constitutional:  Negative for activity change and unexpected weight loss.   HENT:  Negative for  congestion.    Cardiovascular:  Negative for chest pain.   Gastrointestinal:  Negative for blood in stool, constipation and diarrhea.   Endocrine: Negative for cold intolerance and heat intolerance.   Genitourinary:  Negative for dyspareunia, pelvic pain and vaginal discharge.   Musculoskeletal:  Negative for arthralgias, back pain, neck pain and neck stiffness.   Skin:  Negative for rash.   Neurological:  Negative for dizziness and headache.   Psychiatric/Behavioral:  Negative for sleep disturbance. The patient is not nervous/anxious.        Objective   Physical Exam  Vitals and nursing note reviewed. Exam conducted with a chaperone present.   Constitutional:       General: She is not in acute distress.     Appearance: She is well-developed.   HENT:      Head: Normocephalic and atraumatic.   Neck:      Thyroid: No thyromegaly.   Cardiovascular:      Rate and Rhythm: Normal rate and regular rhythm.      Heart sounds: No murmur heard.  Pulmonary:      Effort: Pulmonary effort is normal.      Breath sounds: Normal breath sounds.   Chest:   Breasts:     Right: No inverted nipple or mass.      Left: No inverted nipple or mass.   Abdominal:      General: There is no distension.      Palpations: Abdomen is soft.      Tenderness: There is no abdominal tenderness.   Genitourinary:     Exam position: Supine.      Labia:         Right: No tenderness or lesion.         Left: No tenderness or lesion.       Vagina: Normal. No vaginal discharge or bleeding.      Uterus: Absent.       Adnexa:         Right: No tenderness or fullness.          Left: No tenderness or fullness.        Rectum: Normal anal tone.      Comments: Patient s/p hysterectomy, Vaginal cuff unremarkable. Labia normal, no lesions noted. Urethral meatus unremarkable, no prolapse of mucosa. Anus/perineum normal    Vaginal dryness noted  Musculoskeletal:         General: Normal range of motion.      Cervical back: Normal range of motion and neck supple.   Skin:      General: Skin is warm and dry.   Neurological:      Mental Status: She is alert and oriented to person, place, and time.   Psychiatric:         Behavior: Behavior normal.         Judgment: Judgment normal.         Assessment & Plan   Diagnoses and all orders for this visit:    1. Well woman exam with routine gynecological exam (Primary)    2. Lichen sclerosus  -     clobetasol (Temovate) 0.05 % cream; Apply  topically to the appropriate area as directed 2 (Two) Times a Day.  Dispense: 45 g; Refill: 3    3. Recurrent UTI  -     estradiol (ESTRACE) 0.1 MG/GM vaginal cream; Insert 2 applicators into the vagina 2 (Two) Times a Week.  Dispense: 42.5 g; Refill: 12    4. Vaginal dryness  -     estradiol (ESTRACE) 0.1 MG/GM vaginal cream; Insert 2 applicators into the vagina 2 (Two) Times a Week.  Dispense: 42.5 g; Refill: 12    5. Screening mammogram for breast cancer  -     Mammo Screening Digital Tomosynthesis Bilateral With CAD; Future         Normal GYN exam. Encouraged SBE, pt is aware how to do self breast exam and the importance of same. Discussed weight management and importance of maintaining a healthy weight. Discussed Vitamin D intake and the importance of adequate vitamin D for both bone health and a healthy immune system.  Discussed daily exercise and the importance of same, in regards to a healthy heart as well as helping to maintain her weight and improving her mental health.  Colonoscopy is up to date. Mammogram is up to date. Bone density is up to date. Pap smear is not done per ASCCP guidelines. HPV is not done. Lab work up is up to date through PCP.    Stable on temovate and estrace cream. Continue same.     Pt states that she just had BD done through [facility]. She follows with them in regards to this.     BMI is >= 25 and <30. (Overweight) The following options were offered after discussion;: weight loss educational material (shared in after visit summary)      Aydee Smith  APRN  11/20/2023    Return in about 1 year (around 11/20/2024) for Annual physical.

## 2024-02-07 NOTE — PROGRESS NOTES
Subjective    Ms. Andrew is 78 y.o. female    Chief Complaint: Recurrent UTI    History of Present Illness    78-year-old female established patient in for follow-up regarding history of recurrent urinary tract infections.  Reports 0 infections since last visit.  Had an episode where felt as though he may be getting an infection however doubled up on her cranberry and d-mannose and symptoms resolved.  Last culture showing small amount of bacteria from 9/2023 E. coli.  Remains on Myrbetriq 25 mg for overactive bladder as was unable to tolerate 50 mg dosing.  Has remained on vaginal estrogen cream twice weekly.  Is currently completely asymptomatic.    The following portions of the patient's history were reviewed and updated as appropriate: allergies, current medications, past family history, past medical history, past social history, past surgical history and problem list.    Review of Systems   Constitutional:  Negative for chills and fever.   Gastrointestinal:  Negative for abdominal pain, anal bleeding, blood in stool, nausea and vomiting.   Genitourinary:  Positive for urgency. Negative for dysuria and hematuria.         Current Outpatient Medications:     Aspirin Buf,CaCarb-MgCarb-MgO, 81 MG tablet, Take 81 mg by mouth., Disp: , Rfl:     atenolol (TENORMIN) 25 MG tablet, 1 tablet. daily, Disp: , Rfl:     calcium carbonate (OS-SAMANTA) 600 MG tablet, Take  by mouth., Disp: , Rfl:     cholecalciferol (VITAMIN D3) 25 MCG (1000 UT) tablet, Take 1 tablet by mouth Daily., Disp: , Rfl:     clobetasol (Temovate) 0.05 % cream, Apply  topically to the appropriate area as directed 2 (Two) Times a Day., Disp: 45 g, Rfl: 3    estradiol (ESTRACE) 0.1 MG/GM vaginal cream, Insert 2 applicators into the vagina 2 (Two) Times a Week., Disp: 42.5 g, Rfl: 12    indomethacin (INDOCIN) 50 MG capsule, Take 1 capsule by mouth., Disp: , Rfl:     losartan-hydrochlorothiazide (HYZAAR) 50-12.5 MG per tablet, , Disp: , Rfl:     meclizine 25 MG  chewable tablet chewable tablet, Chew 1 tablet., Disp: , Rfl:     Mirabegron ER (Myrbetriq) 25 MG tablet sustained-release 24 hour 24 hr tablet, Take 1 tablet by mouth Daily., Disp: 90 tablet, Rfl: 3    Multiple Vitamins-Minerals (MULTIVITAMIN AND MINERALS) liquid liquid, Take  by mouth Daily., Disp: , Rfl:     nitrofurantoin, macrocrystal-monohydrate, (MACROBID) 100 MG capsule, Take 1 capsule by mouth 2 (Two) Times a Day., Disp: 14 capsule, Rfl: 0    phenazopyridine (PYRIDIUM) 100 MG tablet, TAKE 1 TABLET THREE TIMES A DAY AS NEEDED FOR BLADDER SPASMS, Disp: 20 tablet, Rfl: 51    Polyethylene Glycol 3350 (CLEARLAX PO), Take  by mouth., Disp: , Rfl:     simvastatin (ZOCOR) 20 MG tablet, Take 1 tablet by mouth., Disp: , Rfl:     triamterene-hydrochlorothiazide (DYAZIDE) 37.5-25 MG per capsule, Take  by mouth., Disp: , Rfl:     Past Medical History:   Diagnosis Date    Arthritis     Cancer     skin ca    Constipation     Gout     Hyperlipidemia     Hypertension     Neoplasm of uncertain behavior     lower lip    Osteoporosis     Plantar fasciitis     Sleep apnea     cpap at hs       Past Surgical History:   Procedure Laterality Date    BACK SURGERY      BLADDER SUSPENSION      CERVICAL CONE BIOPSY      COLONOSCOPY N/A 12/09/2021    Procedure: COLONOSCOPY WITH ANESTHESIA;  Surgeon: Dale Joyce DO;  Location: [facility] ENDOSCOPY;  Service: Gastroenterology;  Laterality: N/A;  pre screen  post diverticulosis  Francisco Barrios MD    EYE SURGERY Left     basal cell carcinoma     GANGLION CYST EXCISION      HEMORROIDECTOMY      HYSTERECTOMY      Abdominal hysterectomy bilateral salpingectomy-pt voices she only has 1 ovary but doesnt know which one       Social History     Socioeconomic History    Marital status:    Tobacco Use    Smoking status: Never    Smokeless tobacco: Never   Vaping Use    Vaping Use: Never used   Substance and Sexual Activity    Alcohol use: Yes     Comment: rare    Drug use: Never     "Sexual activity: Not Currently       Family History   Problem Relation Age of Onset    Diabetes Father     Cancer Father     Prostate cancer Father     Diabetes Brother     Alzheimer's disease Sister     Diabetes Maternal Grandmother     Breast cancer Niece     Colon cancer Neg Hx     Colon polyps Neg Hx     Esophageal cancer Neg Hx        Objective    Temp 97.5 °F (36.4 °C)   Ht 157.5 cm (62.01\")   Wt 71.5 kg (157 lb 9.6 oz)   BMI 28.82 kg/m²     Physical Exam  Nursing note reviewed.   Constitutional:       General: She is not in acute distress.     Appearance: Normal appearance. She is not ill-appearing.   HENT:      Nose: No congestion.   Abdominal:      Tenderness: There is no right CVA tenderness or left CVA tenderness.      Hernia: No hernia is present.   Skin:     General: Skin is warm and dry.   Neurological:      Mental Status: She is alert and oriented to person, place, and time.   Psychiatric:         Mood and Affect: Mood normal.         Behavior: Behavior normal.             Results for orders placed or performed in visit on 02/19/24   POC Urinalysis Dipstick, Multipro    Specimen: Urine   Result Value Ref Range    Color Yellow Yellow, Straw, Dark Yellow, Kelly    Clarity, UA Clear Clear    Glucose, UA Negative Negative mg/dL    Bilirubin Negative Negative    Ketones, UA Negative Negative    Specific Gravity  1.020 1.005 - 1.030    Blood, UA Negative Negative    pH, Urine 7.0 5.0 - 8.0    Protein, POC Negative Negative mg/dL    Urobilinogen, UA 0.2 E.U./dL Normal, 0.2 E.U./dL    Nitrite, UA Negative Negative    Leukocytes Negative Negative     Assessment and Plan    Diagnoses and all orders for this visit:    1. Recurrent UTI (Primary)  -     POC Urinalysis Dipstick, Multipro      Asymptomatic.  0 infections since last visit    Recommend continuing twice vaginal estrogen cream weekly and daily cranberry and d-mannose over-the-counter    Urinalysis is clear no signs of infection    Continue Myrbetriq " 25 mg for overactive bladder

## 2024-02-19 ENCOUNTER — OFFICE VISIT (OUTPATIENT)
Dept: UROLOGY | Facility: CLINIC | Age: 79
End: 2024-02-19
Payer: MEDICARE

## 2024-02-19 VITALS — TEMPERATURE: 97.5 F | WEIGHT: 157.6 LBS | HEIGHT: 62 IN | BODY MASS INDEX: 29 KG/M2

## 2024-02-19 DIAGNOSIS — N39.0 RECURRENT UTI: Primary | ICD-10-CM

## 2024-02-19 LAB
BILIRUB BLD-MCNC: NEGATIVE MG/DL
CLARITY, POC: CLEAR
COLOR UR: YELLOW
GLUCOSE UR STRIP-MCNC: NEGATIVE MG/DL
KETONES UR QL: NEGATIVE
LEUKOCYTE EST, POC: NEGATIVE
NITRITE UR-MCNC: NEGATIVE MG/ML
PH UR: 7 [PH] (ref 5–8)
PROT UR STRIP-MCNC: NEGATIVE MG/DL
RBC # UR STRIP: NEGATIVE /UL
SP GR UR: 1.02 (ref 1–1.03)
UROBILINOGEN UR QL: NORMAL

## 2024-02-19 PROCEDURE — 81001 URINALYSIS AUTO W/SCOPE: CPT

## 2024-02-19 PROCEDURE — 99214 OFFICE O/P EST MOD 30 MIN: CPT

## 2024-02-19 PROCEDURE — 1160F RVW MEDS BY RX/DR IN RCRD: CPT

## 2024-02-19 PROCEDURE — 1159F MED LIST DOCD IN RCRD: CPT

## 2024-04-25 ENCOUNTER — HOSPITAL ENCOUNTER (OUTPATIENT)
Dept: MAMMOGRAPHY | Facility: HOSPITAL | Age: 79
Discharge: HOME | End: 2024-04-25
Admitting: NURSE PRACTITIONER
Payer: MEDICARE

## 2024-04-25 DIAGNOSIS — Z12.31 SCREENING MAMMOGRAM FOR BREAST CANCER: ICD-10-CM

## 2024-04-25 PROCEDURE — 77067 SCR MAMMO BI INCL CAD: CPT

## 2024-04-25 PROCEDURE — 77063 BREAST TOMOSYNTHESIS BI: CPT

## 2024-08-12 NOTE — PROGRESS NOTES
Subjective    Ms. Andrew is 78 y.o. female    Chief Complaint: Recurrent UTI follow-up    History of Present Illness    78-year-old female established patient in for follow-up regarding history of recurrent urinary tract infections.  Reports 0 infections since last visit.  Remains on daily cranberry and d-mannose.  Last culture showing small amount of bacteria from 9/2023 E. coli.  Remains on Myrbetriq 25 mg for overactive bladder as was unable to tolerate 50 mg dosing.  Has remained on vaginal estrogen cream twice weekly.  Is currently completely asymptomatic.     The following portions of the patient's history were reviewed and updated as appropriate: allergies, current medications, past family history, past medical history, past social history, past surgical history and problem list.    Review of Systems   Constitutional:  Negative for chills and fever.   Gastrointestinal:  Negative for abdominal pain, anal bleeding, blood in stool, nausea and vomiting.   Genitourinary:  Negative for dysuria and hematuria.         Current Outpatient Medications:     Aspirin Buf,CaCarb-MgCarb-MgO, 81 MG tablet, Take 81 mg by mouth., Disp: , Rfl:     atenolol (TENORMIN) 25 MG tablet, 1 tablet. daily, Disp: , Rfl:     calcium carbonate (OS-SAMANTA) 600 MG tablet, Take  by mouth., Disp: , Rfl:     cholecalciferol (VITAMIN D3) 25 MCG (1000 UT) tablet, Take 1 tablet by mouth Daily., Disp: , Rfl:     clobetasol (Temovate) 0.05 % cream, Apply  topically to the appropriate area as directed 2 (Two) Times a Day., Disp: 45 g, Rfl: 3    estradiol (ESTRACE) 0.1 MG/GM vaginal cream, Insert 2 applicators into the vagina 2 (Two) Times a Week., Disp: 42.5 g, Rfl: 12    indomethacin (INDOCIN) 50 MG capsule, Take 1 capsule by mouth., Disp: , Rfl:     losartan-hydrochlorothiazide (HYZAAR) 50-12.5 MG per tablet, , Disp: , Rfl:     meclizine 25 MG chewable tablet chewable tablet, Chew 1 tablet., Disp: , Rfl:     Mirabegron ER (Myrbetriq) 25 MG tablet  sustained-release 24 hour 24 hr tablet, Take 1 tablet by mouth Daily., Disp: 90 tablet, Rfl: 3    Multiple Vitamins-Minerals (MULTIVITAMIN AND MINERALS) liquid liquid, Take  by mouth Daily., Disp: , Rfl:     Polyethylene Glycol 3350 (CLEARLAX PO), Take  by mouth., Disp: , Rfl:     simvastatin (ZOCOR) 20 MG tablet, Take 1 tablet by mouth., Disp: , Rfl:     triamterene-hydrochlorothiazide (DYAZIDE) 37.5-25 MG per capsule, Take  by mouth., Disp: , Rfl:     nitrofurantoin, macrocrystal-monohydrate, (MACROBID) 100 MG capsule, Take 1 capsule by mouth 2 (Two) Times a Day. (Patient not taking: Reported on 8/19/2024), Disp: 14 capsule, Rfl: 0    phenazopyridine (PYRIDIUM) 100 MG tablet, TAKE 1 TABLET THREE TIMES A DAY AS NEEDED FOR BLADDER SPASMS (Patient not taking: Reported on 8/19/2024), Disp: 20 tablet, Rfl: 51    Past Medical History:   Diagnosis Date    Arthritis     Cancer     skin ca    Constipation     Gout     Hyperlipidemia     Hypertension     Neoplasm of uncertain behavior     lower lip    Osteoporosis     Plantar fasciitis     Sleep apnea     cpap at hs       Past Surgical History:   Procedure Laterality Date    BACK SURGERY      BLADDER SUSPENSION      CERVICAL CONE BIOPSY      COLONOSCOPY N/A 12/09/2021    Procedure: COLONOSCOPY WITH ANESTHESIA;  Surgeon: Dale Joyce DO;  Location: [facility] ENDOSCOPY;  Service: Gastroenterology;  Laterality: N/A;  pre screen  post diverticulosis  Francisco Barrios MD    EYE SURGERY Left     basal cell carcinoma     GANGLION CYST EXCISION      HEMORROIDECTOMY      HYSTERECTOMY      Abdominal hysterectomy bilateral salpingectomy-pt voices she only has 1 ovary but doesnt know which one       Social History     Socioeconomic History    Marital status:    Tobacco Use    Smoking status: Never    Smokeless tobacco: Never   Vaping Use    Vaping status: Never Used   Substance and Sexual Activity    Alcohol use: Yes     Comment: rare    Drug use: Never    Sexual activity:  "Not Currently       Family History   Problem Relation Age of Onset    Diabetes Father     Cancer Father     Prostate cancer Father     Diabetes Brother     Alzheimer's disease Sister     Diabetes Maternal Grandmother     Breast cancer Niece     Colon cancer Neg Hx     Colon polyps Neg Hx     Esophageal cancer Neg Hx        Objective    Temp 97.6 °F (36.4 °C)   Ht 157.5 cm (62.01\")   Wt 67.9 kg (149 lb 9.6 oz)   BMI 27.36 kg/m²     Physical Exam  Nursing note reviewed.   Constitutional:       General: She is not in acute distress.     Appearance: Normal appearance. She is not ill-appearing.   HENT:      Nose: No congestion.   Abdominal:      Tenderness: There is no right CVA tenderness or left CVA tenderness.      Hernia: No hernia is present.   Skin:     General: Skin is warm and dry.   Neurological:      Mental Status: She is alert and oriented to person, place, and time.   Psychiatric:         Mood and Affect: Mood normal.         Behavior: Behavior normal.             Results for orders placed or performed in visit on 08/19/24   POC Urinalysis Dipstick, Multipro    Specimen: Urine   Result Value Ref Range    Color Yellow Yellow, Straw, Dark Yellow, Kelly    Clarity, UA Clear Clear    Glucose, UA Negative Negative mg/dL    Bilirubin Negative Negative    Ketones, UA Negative Negative    Specific Gravity  1.020 1.005 - 1.030    Blood, UA Trace (A) Negative    pH, Urine 7.0 5.0 - 8.0    Protein, POC Negative Negative mg/dL    Urobilinogen, UA 0.2 E.U./dL Normal, 0.2 E.U./dL    Nitrite, UA Negative Negative    Leukocytes Negative Negative     Assessment and Plan    Diagnoses and all orders for this visit:    1. Recurrent UTI (Primary)  -     POC Urinalysis Dipstick, Multipro      Urinalysis is clear patient is asymptomatic    Recommend continuing twice weekly vaginal estrogen cream estradiol along with daily 25 mg Myrbetriq for overactive bladder urge incontinence    Patient would like to be seen on an as-needed " basis if patient PCP will  the prescribing of the estradiol and Myrbetriq.  If not would be glad to still see patient yearly for prescription refills

## 2024-08-19 ENCOUNTER — OFFICE VISIT (OUTPATIENT)
Dept: UROLOGY | Facility: CLINIC | Age: 79
End: 2024-08-19
Payer: MEDICARE

## 2024-08-19 VITALS — HEIGHT: 62 IN | TEMPERATURE: 97.6 F | BODY MASS INDEX: 27.53 KG/M2 | WEIGHT: 149.6 LBS

## 2024-08-19 DIAGNOSIS — N39.0 RECURRENT UTI: Primary | ICD-10-CM

## 2024-08-19 PROCEDURE — 1160F RVW MEDS BY RX/DR IN RCRD: CPT

## 2024-08-19 PROCEDURE — 1159F MED LIST DOCD IN RCRD: CPT

## 2024-08-19 PROCEDURE — 99213 OFFICE O/P EST LOW 20 MIN: CPT

## 2024-08-19 PROCEDURE — 81001 URINALYSIS AUTO W/SCOPE: CPT

## 2024-09-18 ENCOUNTER — OFFICE VISIT (OUTPATIENT)
Dept: UROLOGY | Facility: CLINIC | Age: 79
End: 2024-09-18
Payer: MEDICARE

## 2024-09-18 VITALS — BODY MASS INDEX: 27.58 KG/M2 | TEMPERATURE: 97.5 F | HEIGHT: 62 IN | WEIGHT: 149.9 LBS

## 2024-09-18 DIAGNOSIS — N39.0 RECURRENT UTI: Primary | ICD-10-CM

## 2024-09-18 LAB
BILIRUB BLD-MCNC: NEGATIVE MG/DL
CLARITY, POC: CLEAR
COLOR UR: YELLOW
GLUCOSE UR STRIP-MCNC: NEGATIVE MG/DL
KETONES UR QL: NEGATIVE
LEUKOCYTE EST, POC: ABNORMAL
NITRITE UR-MCNC: NEGATIVE MG/ML
PH UR: 8 [PH] (ref 5–8)
PROT UR STRIP-MCNC: ABNORMAL MG/DL
RBC # UR STRIP: ABNORMAL /UL
SP GR UR: 1.02 (ref 1–1.03)
UROBILINOGEN UR QL: ABNORMAL

## 2024-09-18 PROCEDURE — 87088 URINE BACTERIA CULTURE: CPT

## 2024-09-18 PROCEDURE — 87186 SC STD MICRODIL/AGAR DIL: CPT

## 2024-09-18 PROCEDURE — 87086 URINE CULTURE/COLONY COUNT: CPT

## 2024-09-18 RX ORDER — CEFDINIR 300 MG/1
300 CAPSULE ORAL 2 TIMES DAILY
Qty: 20 CAPSULE | Refills: 0 | Status: SHIPPED | OUTPATIENT
Start: 2024-09-18

## 2024-09-20 ENCOUNTER — TELEPHONE (OUTPATIENT)
Dept: UROLOGY | Facility: CLINIC | Age: 79
End: 2024-09-20
Payer: OTHER GOVERNMENT

## 2024-09-20 DIAGNOSIS — N30.00 ACUTE CYSTITIS WITHOUT HEMATURIA: ICD-10-CM

## 2024-09-20 LAB — BACTERIA SPEC AEROBE CULT: ABNORMAL

## 2024-09-20 RX ORDER — NITROFURANTOIN 25; 75 MG/1; MG/1
100 CAPSULE ORAL 2 TIMES DAILY
Qty: 14 CAPSULE | Refills: 0 | Status: SHIPPED | OUTPATIENT
Start: 2024-09-20

## 2024-10-09 DIAGNOSIS — N39.46 MIXED INCONTINENCE: ICD-10-CM

## 2024-10-09 RX ORDER — MIRABEGRON 25 MG/1
25 TABLET, FILM COATED, EXTENDED RELEASE ORAL DAILY
Qty: 90 TABLET | Refills: 3 | Status: CANCELLED | OUTPATIENT
Start: 2024-10-09

## 2024-10-09 NOTE — TELEPHONE ENCOUNTER
Patient called for refill on myrbetriq. Patient was recently seen by Aydee for annual. Aydee out of office. Meds pended.

## 2024-10-10 ENCOUNTER — TELEPHONE (OUTPATIENT)
Dept: OBSTETRICS AND GYNECOLOGY | Age: 79
End: 2024-10-10
Payer: OTHER GOVERNMENT

## 2024-10-10 DIAGNOSIS — N39.46 MIXED INCONTINENCE: ICD-10-CM

## 2024-10-10 RX ORDER — MIRABEGRON 25 MG/1
25 TABLET, FILM COATED, EXTENDED RELEASE ORAL DAILY
Qty: 90 TABLET | Refills: 0 | Status: SHIPPED | OUTPATIENT
Start: 2024-10-10

## 2024-10-10 NOTE — TELEPHONE ENCOUNTER
Caller: Keren Andrew    Relationship to patient: Self    Best call back number: [phone] (home)       Patient is needing: PT CALLING TO CHECK ON STATUS OF MEDICATIONS SENT TO PHARMACY YESTERDAY - PHARMACY HAS STILL NOT RECEIVED THEM. PLEASE ADVISE. HUB CREATED NEW TE AS THE OTHER ONE WAS ROUTED DIRECTLY TO .

## 2024-12-03 ENCOUNTER — OFFICE VISIT (OUTPATIENT)
Dept: OBSTETRICS AND GYNECOLOGY | Age: 79
End: 2024-12-03
Payer: MEDICARE

## 2024-12-03 VITALS
WEIGHT: 153 LBS | BODY MASS INDEX: 28.16 KG/M2 | HEIGHT: 62 IN | DIASTOLIC BLOOD PRESSURE: 82 MMHG | SYSTOLIC BLOOD PRESSURE: 130 MMHG

## 2024-12-03 DIAGNOSIS — Z12.31 ENCOUNTER FOR SCREENING MAMMOGRAM FOR BREAST CANCER: Primary | ICD-10-CM

## 2024-12-03 DIAGNOSIS — Z78.0 MENOPAUSE: ICD-10-CM

## 2024-12-03 DIAGNOSIS — N89.8 VAGINAL DRYNESS: ICD-10-CM

## 2024-12-03 DIAGNOSIS — N39.46 MIXED INCONTINENCE: ICD-10-CM

## 2024-12-03 DIAGNOSIS — L90.0 LICHEN SCLEROSUS: ICD-10-CM

## 2024-12-03 DIAGNOSIS — N39.0 RECURRENT UTI: ICD-10-CM

## 2024-12-03 RX ORDER — TRIAMTERENE AND HYDROCHLOROTHIAZIDE 37.5; 25 MG/1; MG/1
1 TABLET ORAL EVERY MORNING
COMMUNITY
Start: 2024-10-30

## 2024-12-03 RX ORDER — ESTRADIOL 0.1 MG/G
2 CREAM VAGINAL 2 TIMES WEEKLY
Qty: 42.5 G | Refills: 12 | Status: SHIPPED | OUTPATIENT
Start: 2024-12-05

## 2024-12-03 RX ORDER — AMLODIPINE BESYLATE 5 MG/1
5 TABLET ORAL
COMMUNITY
Start: 2024-10-30

## 2024-12-03 RX ORDER — CLOBETASOL PROPIONATE 0.5 MG/G
CREAM TOPICAL 2 TIMES DAILY
Qty: 45 G | Refills: 3 | Status: SHIPPED | OUTPATIENT
Start: 2024-12-03

## 2024-12-03 RX ORDER — MIRABEGRON 25 MG/1
25 TABLET, FILM COATED, EXTENDED RELEASE ORAL DAILY
Qty: 90 TABLET | Refills: 3 | Status: SHIPPED | OUTPATIENT
Start: 2024-12-03

## 2024-12-03 RX ORDER — ESCITALOPRAM OXALATE 10 MG/1
10 TABLET ORAL EVERY MORNING
COMMUNITY
Start: 2024-10-31

## 2024-12-03 RX ORDER — OLMESARTAN MEDOXOMIL 40 MG/1
40 TABLET ORAL EVERY MORNING
COMMUNITY
Start: 2024-10-30

## 2024-12-03 NOTE — PROGRESS NOTES
"Chief Complaint   Patient presents with    Gynecologic Exam     Patient here for yearly check up, patient has had a hysterectomy, mammogram 4/25/24, dexa 11/9/20, colonoscopy 12/9/21  Patient denies any problems or concerns.         Subjective     Keren Andrew is a 79 y.o. female    History of Present Illness  Pt comes in today for yearly follow up. Has no complaints.     /82 (BP Location: Left arm, Patient Position: Sitting, Cuff Size: Adult)   Ht 157.5 cm (62\")   Wt 69.4 kg (153 lb)   BMI 27.98 kg/m²     Outpatient Encounter Medications as of 12/3/2024   Medication Sig Dispense Refill    amLODIPine (NORVASC) 5 MG tablet Take 1 tablet by mouth every night at bedtime.      Aspirin Buf,CaCarb-MgCarb-MgO, 81 MG tablet Take 81 mg by mouth.      atenolol (TENORMIN) 25 MG tablet 1 tablet. daily      calcium carbonate (OS-SAMANTA) 600 MG tablet Take  by mouth.      cefdinir (OMNICEF) 300 MG capsule Take 1 capsule by mouth 2 (Two) Times a Day. 20 capsule 0    cholecalciferol (VITAMIN D3) 25 MCG (1000 UT) tablet Take 1 tablet by mouth Daily.      clobetasol propionate (Temovate) 0.05 % cream Apply  topically to the appropriate area as directed 2 (Two) Times a Day. 45 g 3    escitalopram (LEXAPRO) 10 MG tablet Take 1 tablet by mouth Every Morning.      [START ON 12/5/2024] estradiol (ESTRACE) 0.1 MG/GM vaginal cream Insert 2 g into the vagina 2 (Two) Times a Week. 42.5 g 12    indomethacin (INDOCIN) 50 MG capsule Take 1 capsule by mouth.      meclizine 25 MG chewable tablet chewable tablet Chew 1 tablet.      Mirabegron ER (Myrbetriq) 25 MG tablet sustained-release 24 hour 24 hr tablet Take 1 tablet by mouth Daily. 90 tablet 3    Multiple Vitamins-Minerals (MULTIVITAMIN AND MINERALS) liquid liquid Take  by mouth Daily.      olmesartan (BENICAR) 40 MG tablet Take 1 tablet by mouth Every Morning.      phenazopyridine (PYRIDIUM) 100 MG tablet TAKE 1 TABLET THREE TIMES A DAY AS NEEDED FOR BLADDER SPASMS 20 tablet 51    " Polyethylene Glycol 3350 (CLEARLAX PO) Take  by mouth.      simvastatin (ZOCOR) 20 MG tablet Take 1 tablet by mouth.      triamterene-hydrochlorothiazide (MAXZIDE-25) 37.5-25 MG per tablet Take 1 tablet by mouth Every Morning.      [DISCONTINUED] clobetasol (Temovate) 0.05 % cream Apply  topically to the appropriate area as directed 2 (Two) Times a Day. 45 g 3    [DISCONTINUED] estradiol (ESTRACE) 0.1 MG/GM vaginal cream Insert 2 applicators into the vagina 2 (Two) Times a Week. 42.5 g 12    [DISCONTINUED] Mirabegron ER (Myrbetriq) 25 MG tablet sustained-release 24 hour 24 hr tablet Take 1 tablet by mouth Daily. 90 tablet 0    [DISCONTINUED] losartan-hydrochlorothiazide (HYZAAR) 50-12.5 MG per tablet       [DISCONTINUED] nitrofurantoin, macrocrystal-monohydrate, (MACROBID) 100 MG capsule Take 1 capsule by mouth 2 (Two) Times a Day. 14 capsule 0    [DISCONTINUED] triamterene-hydrochlorothiazide (DYAZIDE) 37.5-25 MG per capsule Take  by mouth.       No facility-administered encounter medications on file as of 12/3/2024.       Past Medical History  Past Medical History:   Diagnosis Date    Arthritis     Cancer     Constipation     Gout     Hyperlipidemia     Hypertension     Neoplasm of uncertain behavior     Osteoporosis     Plantar fasciitis     Sleep apnea         Surgical History  Past Surgical History:   Procedure Laterality Date    BACK SURGERY      BLADDER SUSPENSION      CERVICAL CONE BIOPSY      COLONOSCOPY N/A 12/09/2021    Procedure: COLONOSCOPY WITH ANESTHESIA;  Surgeon: Dale Joyce DO;  Location: [facility] ENDOSCOPY;  Service: Gastroenterology;  Laterality: N/A;  pre screen  post diverticulosis  Francisco Barrios MD    EYE SURGERY Left     basal cell carcinoma     GANGLION CYST EXCISION      HEMORROIDECTOMY      HYSTERECTOMY      Abdominal hysterectomy bilateral salpingectomy-pt voices she only has 1 ovary but doesnt know which one       Family History  Family History   Problem Relation Age of Onset     Diabetes Father     Cancer Father     Prostate cancer Father     Diabetes Brother     Alzheimer's disease Sister     Diabetes Maternal Grandmother     Breast cancer Niece     Colon cancer Neg Hx     Colon polyps Neg Hx     Esophageal cancer Neg Hx        The following portions of the patient's history were reviewed and updated as appropriate: allergies, current medications, past family history, past medical history, past social history, past surgical history, and problem list.    Review of Systems   Constitutional:  Negative for activity change and unexpected weight loss.   Cardiovascular:  Negative for chest pain.   Gastrointestinal:  Negative for blood in stool, constipation and diarrhea.   Endocrine: Negative for cold intolerance and heat intolerance.   Genitourinary:  Negative for dyspareunia, pelvic pain and vaginal discharge.   Musculoskeletal:  Negative for arthralgias, back pain, neck pain and neck stiffness.   Skin:  Negative for rash.   Neurological:  Negative for dizziness and headache.   Psychiatric/Behavioral:  Negative for sleep disturbance. The patient is not nervous/anxious.        Objective   Physical Exam  Vitals and nursing note reviewed. Exam conducted with a chaperone present.   Constitutional:       General: She is not in acute distress.     Appearance: She is well-developed.   HENT:      Head: Normocephalic and atraumatic.   Neck:      Thyroid: No thyromegaly.   Cardiovascular:      Rate and Rhythm: Normal rate and regular rhythm.      Heart sounds: No murmur heard.  Pulmonary:      Effort: Pulmonary effort is normal.      Breath sounds: Normal breath sounds.   Chest:   Breasts:     Right: No inverted nipple or mass.      Left: No inverted nipple or mass.   Abdominal:      General: There is no distension.      Palpations: Abdomen is soft.      Tenderness: There is no abdominal tenderness.   Genitourinary:     Exam position: Supine.      Labia:         Right: No tenderness or lesion.          Left: No tenderness or lesion.       Vagina: Normal. No vaginal discharge or bleeding.      Uterus: Absent.       Rectum: Normal anal tone.      Comments: Patient s/ophysterectomy, Vaginal cuff unremarkable. Labia normal, no lesions noted. Urethral meatus unremarkable, no prolapse of mucosa. Anus/perineum normal    Vaginal dryness noted.   Musculoskeletal:         General: Normal range of motion.      Cervical back: Normal range of motion and neck supple.   Skin:     General: Skin is warm and dry.   Neurological:      Mental Status: She is alert and oriented to person, place, and time.   Psychiatric:         Behavior: Behavior normal.         Judgment: Judgment normal.          Assessment & Plan   Diagnoses and all orders for this visit:    1. Encounter for screening mammogram for breast cancer (Primary)  -     Mammo Screening Digital Tomosynthesis Bilateral With CAD; Future    2. Lichen sclerosus  -     clobetasol propionate (Temovate) 0.05 % cream; Apply  topically to the appropriate area as directed 2 (Two) Times a Day.  Dispense: 45 g; Refill: 3    3. Recurrent UTI  -     estradiol (ESTRACE) 0.1 MG/GM vaginal cream; Insert 2 g into the vagina 2 (Two) Times a Week.  Dispense: 42.5 g; Refill: 12    4. Vaginal dryness  -     estradiol (ESTRACE) 0.1 MG/GM vaginal cream; Insert 2 g into the vagina 2 (Two) Times a Week.  Dispense: 42.5 g; Refill: 12    5. Mixed incontinence  -     Mirabegron ER (Myrbetriq) 25 MG tablet sustained-release 24 hour 24 hr tablet; Take 1 tablet by mouth Daily.  Dispense: 90 tablet; Refill: 3    6. Menopause  -     dexa bone density axial; Future         Normal GYN exam. Encouraged SBE, pt is aware how to do self breast exam and the importance of same. Discussed weight management and importance of maintaining a healthy weight. Discussed Vitamin D intake and the importance of adequate vitamin D for both bone health and a healthy immune system.  Discussed daily exercise and the importance of  same, in regards to a healthy heart as well as helping to maintain her weight and improving her mental health.  Colonoscopy is up to date. Mammogram is up to date. Bone density will be scheduled. Pap smear is not done per ASCCP guidelines. HPV is not done. Lab work up is up to date through PCP.    Pt has vaginal dryness but isn't sexually active and isn't symptomatic.     Doing well on estrace cream, myrbetriq and temovate. Continue same.    BMI is >= 25 and <30. (Overweight) The following options were offered after discussion;: weight loss educational material (shared in after visit summary)      Aydee Smith, APRN  12/3/2024    No follow-ups on file.

## 2025-03-13 NOTE — PROGRESS NOTES
Subjective    Ms. Andrew is 79 y.o. female    Chief Complaint: follow up urge incontinence and recurrent UTIs     History of Present Illness    79-year-old female established patient in for follow-up regarding history of recurrent urinary tract infections & urge incontinence.  Reports 1 episode 2 weeks ago with return of burning with urination x 24 hours.  Symptoms have since resolved.  No confirmed infections since last visit back in September 2024 when patient did experience return of symptoms with urine culture +50,000 colony-forming units Enterococcus.  Remains on Myrbetriq 25 mg for overactive bladder as was unable to tolerate 50 mg dosing.  Medication working well.  Has remained on vaginal estrogen cream twice weekly in addition to daily cranberry and d-mannose.    The following portions of the patient's history were reviewed and updated as appropriate: allergies, current medications, past family history, past medical history, past social history, past surgical history and problem list.    Review of Systems   Constitutional:  Negative for chills and fever.   Gastrointestinal:  Negative for abdominal pain, anal bleeding, blood in stool, nausea and vomiting.   Genitourinary:  Positive for urgency. Negative for dysuria, flank pain, frequency and hematuria.         Current Outpatient Medications:     amLODIPine (NORVASC) 5 MG tablet, Take 1 tablet by mouth every night at bedtime., Disp: , Rfl:     Aspirin Buf,CaCarb-MgCarb-MgO, 81 MG tablet, Take 81 mg by mouth., Disp: , Rfl:     atenolol (TENORMIN) 25 MG tablet, 1 tablet. daily, Disp: , Rfl:     calcium carbonate (OS-SAMANTA) 600 MG tablet, Take  by mouth., Disp: , Rfl:     cholecalciferol (VITAMIN D3) 25 MCG (1000 UT) tablet, Take 1 tablet by mouth Daily., Disp: , Rfl:     clobetasol propionate (Temovate) 0.05 % cream, Apply  topically to the appropriate area as directed 2 (Two) Times a Day., Disp: 45 g, Rfl: 3    escitalopram (LEXAPRO) 10 MG tablet, Take 1 tablet  by mouth Every Morning., Disp: , Rfl:     estradiol (ESTRACE) 0.1 MG/GM vaginal cream, Insert 2 g into the vagina 2 (Two) Times a Week., Disp: 42.5 g, Rfl: 12    indomethacin (INDOCIN) 50 MG capsule, Take 1 capsule by mouth., Disp: , Rfl:     meclizine 25 MG chewable tablet chewable tablet, Chew 1 tablet., Disp: , Rfl:     Mirabegron ER (Myrbetriq) 25 MG tablet sustained-release 24 hour 24 hr tablet, Take 1 tablet by mouth Daily., Disp: 90 tablet, Rfl: 3    Multiple Vitamins-Minerals (MULTIVITAMIN AND MINERALS) liquid liquid, Take  by mouth Daily., Disp: , Rfl:     olmesartan (BENICAR) 40 MG tablet, Take 1 tablet by mouth Every Morning., Disp: , Rfl:     phenazopyridine (PYRIDIUM) 100 MG tablet, TAKE 1 TABLET THREE TIMES A DAY AS NEEDED FOR BLADDER SPASMS, Disp: 20 tablet, Rfl: 51    Polyethylene Glycol 3350 (CLEARLAX PO), Take  by mouth., Disp: , Rfl:     potassium chloride (MICRO-K) 10 MEQ CR capsule, Take 1 capsule by mouth Daily., Disp: , Rfl:     simvastatin (ZOCOR) 20 MG tablet, Take 1 tablet by mouth., Disp: , Rfl:     triamterene-hydrochlorothiazide (MAXZIDE-25) 37.5-25 MG per tablet, Take 1 tablet by mouth Every Morning., Disp: , Rfl:     cefdinir (OMNICEF) 300 MG capsule, Take 1 capsule by mouth 2 (Two) Times a Day. (Patient not taking: Reported on 3/19/2025), Disp: 20 capsule, Rfl: 0    Past Medical History:   Diagnosis Date    Arthritis     Cancer     skin ca    Constipation     Gout     Hyperlipidemia     Hypertension     Neoplasm of uncertain behavior     lower lip    Osteoporosis     Plantar fasciitis     Sleep apnea     cpap at hs       Past Surgical History:   Procedure Laterality Date    BACK SURGERY      BLADDER SUSPENSION      CERVICAL CONE BIOPSY      COLONOSCOPY N/A 12/09/2021    Procedure: COLONOSCOPY WITH ANESTHESIA;  Surgeon: Dale Joyce DO;  Location: [facility] ENDOSCOPY;  Service: Gastroenterology;  Laterality: N/A;  pre screen  post diverticulosis  Francisco Barrios MD    EYE SURGERY  "Left     basal cell carcinoma     GANGLION CYST EXCISION      HEMORROIDECTOMY      HYSTERECTOMY      Abdominal hysterectomy bilateral salpingectomy-pt voices she only has 1 ovary but doesnt know which one       Social History     Socioeconomic History    Marital status:    Tobacco Use    Smoking status: Never    Smokeless tobacco: Never   Vaping Use    Vaping status: Never Used   Substance and Sexual Activity    Alcohol use: Yes     Comment: rare    Drug use: Never    Sexual activity: Not Currently       Family History   Problem Relation Age of Onset    Diabetes Father     Cancer Father     Prostate cancer Father     Diabetes Brother     Alzheimer's disease Sister     Diabetes Maternal Grandmother     Breast cancer Niece     Colon cancer Neg Hx     Colon polyps Neg Hx     Esophageal cancer Neg Hx        Objective    Temp 97.8 °F (36.6 °C)   Ht 157.5 cm (62.01\")   Wt 69.4 kg (153 lb)   BMI 27.98 kg/m²     Physical Exam  Nursing note reviewed.   Constitutional:       General: She is not in acute distress.     Appearance: Normal appearance. She is not ill-appearing.   HENT:      Nose: No congestion.   Abdominal:      Tenderness: There is no right CVA tenderness or left CVA tenderness.      Hernia: No hernia is present.   Skin:     General: Skin is warm and dry.   Neurological:      Mental Status: She is alert and oriented to person, place, and time.   Psychiatric:         Mood and Affect: Mood normal.         Behavior: Behavior normal.             Results for orders placed or performed in visit on 03/19/25   POC Urinalysis Dipstick, Multipro    Collection Time: 03/19/25  9:59 AM    Specimen: Urine   Result Value Ref Range    Color Yellow Yellow, Straw, Dark Yellow, Kelly    Clarity, UA Clear Clear    Glucose, UA Negative Negative mg/dL    Bilirubin Negative Negative    Ketones, UA Negative Negative    Specific Gravity  1.015 1.005 - 1.030    Blood, UA Negative Negative    pH, Urine 7.0 5.0 - 8.0    Protein, " POC Negative Negative mg/dL    Urobilinogen, UA 0.2 E.U./dL Normal, 0.2 E.U./dL    Nitrite, UA Negative Negative    Leukocytes Small (1+) (A) Negative     Assessment and Plan    Diagnoses and all orders for this visit:    1. Recurrent UTI (Primary)  -     POC Urinalysis Dipstick, Multipro        Patient asymptomatic    Only white blood cells seen today on urinalysis and microscopic review.    Recommend continuing vaginal estrogen cream twice weekly along with over-the-counter cranberry and d-mannose    Continue daily Myrbetriq 25 mg.  Patient appears to now be receiving through OB/GYN.    Follow-up 1 year-if patient doing well at 1 year visit feel as though could follow as needed

## 2025-03-19 ENCOUNTER — OFFICE VISIT (OUTPATIENT)
Dept: UROLOGY | Facility: CLINIC | Age: 80
End: 2025-03-19
Payer: MEDICARE

## 2025-03-19 VITALS — BODY MASS INDEX: 28.16 KG/M2 | WEIGHT: 153 LBS | TEMPERATURE: 97.8 F | HEIGHT: 62 IN

## 2025-03-19 DIAGNOSIS — N39.0 RECURRENT UTI: Primary | ICD-10-CM

## 2025-03-19 RX ORDER — POTASSIUM CHLORIDE 750 MG/1
1 CAPSULE, EXTENDED RELEASE ORAL DAILY
COMMUNITY
Start: 2024-12-31

## 2025-06-25 ENCOUNTER — HOSPITAL ENCOUNTER (OUTPATIENT)
Dept: SLEEP CENTER | Age: 80
Discharge: HOME | End: 2025-06-27
Payer: OTHER GOVERNMENT

## 2025-06-25 PROCEDURE — 95810 POLYSOM 6/> YRS 4/> PARAM: CPT

## 2025-06-26 NOTE — PROGRESS NOTES
[facility]  [address] [place] [address]  Phone [phone] Fax [phone]       Polysomnography Report  Patient Name Symone Milligan Account Number [id number]    1945 Referring Provider Uzair José MD   Age/ Gender 79 years/F Interpreting physician Aurea Mcgovern MD,[facility]   Neck circumference/  Mallampati classification 13.5 in/class 4 Night Technician Dony Haney, RPSGT   [place] score 10/24 Scoring Technician Melani Horn, RPSGT   Height 62.0 in Indications for the test excessive daytime somnolence   Weight 162.0 lbs Test Diagnostic Polysomnogram   BMI 29.6 Date of test 2025     Procedure  A Diagnostic Polysomnogram was conducted on the night of 2025.  The study was performed and scored per AASM guidelines.  The following were monitored: frontal, central, and occipital EEG, electrooculogram (EOG), submentalis EMG, nasal and oral airflow, intranasal pressure, thoracic plethysmography, abdominal plethysmography, anterior tibialis EMG, electrocardiogram, body position, and positive airway pressure (PAP).  Arterial oxygen saturation was monitored with a pulse oximeter.  The study was scored utilizing 30 second epochs.  Hypopneas were scored using per AASM definition VIII, D, 1B.    Sleep Scoring Data  Lights out 10:21:05 PM Sleep latency 40.5 min Time in N1 7.0 min N1% 2.4%   Lights on 4:33:35 AM WASO 38.0 min Time in N2 265.5 min N2% 90.3%   TIB/.5 min Sleep efficiency 88.6% Time in N3 0.0 min N3% 0.0%   .0 min REM latency 165.0 min Time in R 21.5 min R% 7.3%     Respiratory Events Summary   NREM REM Total   Hypopnea index 9.9 8.4 9.8   Apnea index 0.0 0.0 0.0   RERA index 0.0 0.0 0.0   AHI 9.9 8.4 9.8   RDI (AHI + RERA index) 9.9 8.4 9.8     Respiratory Events by Sleep Stage   Obstructive Apneas OA Index Central Apneas CA Index Mixed Apneas MA Index   Hypopneas H Index   RERAs   R Index   NREM 0 0.0 0 0.0 0 0.0 45 
will be notified of the formal results and recommendations after the study is scored and interpreted.  The report will be sent to the patient's referring provider.    Technician: DARELL Haney, KYLE,RPSGT,RST

## 2025-06-27 DIAGNOSIS — G47.33 OSA (OBSTRUCTIVE SLEEP APNEA): Primary | ICD-10-CM

## 2025-06-30 ENCOUNTER — FOLLOWUP TELEPHONE ENCOUNTER (OUTPATIENT)
Dept: SLEEP CENTER | Age: 80
End: 2025-06-30

## 2025-06-30 NOTE — TELEPHONE ENCOUNTER
Called and spoke to patient and discussed there results of the diagnostic sleep study.  Explained order for auto cpap.  Order for auto cpap was sent to [facility].  Sleep study report was sent to the referring provider.

## 2025-07-28 ENCOUNTER — OFFICE VISIT (OUTPATIENT)
Dept: PULMONOLOGY | Age: 80
End: 2025-07-28
Payer: MEDICARE

## 2025-07-28 VITALS
SYSTOLIC BLOOD PRESSURE: 158 MMHG | HEART RATE: 60 BPM | DIASTOLIC BLOOD PRESSURE: 68 MMHG | TEMPERATURE: 98 F | OXYGEN SATURATION: 97 % | HEIGHT: 62 IN | BODY MASS INDEX: 29.77 KG/M2 | WEIGHT: 161.8 LBS

## 2025-07-28 DIAGNOSIS — G47.10 HYPERSOMNIA: ICD-10-CM

## 2025-07-28 DIAGNOSIS — I10 PRIMARY HYPERTENSION: ICD-10-CM

## 2025-07-28 DIAGNOSIS — G47.33 MILD OBSTRUCTIVE SLEEP APNEA: Primary | ICD-10-CM

## 2025-07-28 DIAGNOSIS — R06.83 SNORING: ICD-10-CM

## 2025-07-28 PROCEDURE — G8419 CALC BMI OUT NRM PARAM NOF/U: HCPCS | Performed by: NURSE PRACTITIONER

## 2025-07-28 PROCEDURE — 4004F PT TOBACCO SCREEN RCVD TLK: CPT | Performed by: NURSE PRACTITIONER

## 2025-07-28 PROCEDURE — G8399 PT W/DXA RESULTS DOCUMENT: HCPCS | Performed by: NURSE PRACTITIONER

## 2025-07-28 PROCEDURE — 3078F DIAST BP <80 MM HG: CPT | Performed by: NURSE PRACTITIONER

## 2025-07-28 PROCEDURE — 1090F PRES/ABSN URINE INCON ASSESS: CPT | Performed by: NURSE PRACTITIONER

## 2025-07-28 PROCEDURE — 3077F SYST BP >= 140 MM HG: CPT | Performed by: NURSE PRACTITIONER

## 2025-07-28 PROCEDURE — 1123F ACP DISCUSS/DSCN MKR DOCD: CPT | Performed by: NURSE PRACTITIONER

## 2025-07-28 PROCEDURE — 1159F MED LIST DOCD IN RCRD: CPT | Performed by: NURSE PRACTITIONER

## 2025-07-28 PROCEDURE — G8427 DOCREV CUR MEDS BY ELIG CLIN: HCPCS | Performed by: NURSE PRACTITIONER

## 2025-07-28 PROCEDURE — 99204 OFFICE O/P NEW MOD 45 MIN: CPT | Performed by: NURSE PRACTITIONER

## 2025-07-28 ASSESSMENT — ENCOUNTER SYMPTOMS
EYES NEGATIVE: 1
ALLERGIC/IMMUNOLOGIC NEGATIVE: 1
APNEA: 1
GASTROINTESTINAL NEGATIVE: 1

## 2025-07-28 NOTE — PROGRESS NOTES
Pulmonary and Sleep Medicine    Symone Milligan (:  1945) is a 79 y.o. female,New patient, here for evaluation of the following chief complaint(s):  New Patient (BRANDT on cpap, dme report )      Referring physician:  Uzair José Md  [address]  [place] [phone]     ASSESSMENT/PLAN:  1. Mild obstructive sleep apnea PSG 25 AHI 9  2. Snoring  3. Primary hypertension  4. Hypersomnia        Continue management with CPAP as she feels it helps and she is compliant. Her blood pressure is elevated today. Advised patient to monitor blood pressure at home and contact PCP if not improving.        Return in about 1 year (around 2026).    SUBJECTIVE/OBJECTIVE:        HPI    Patient presents to establish care for obstructive sleep apnea. Polysomnography was completed on 25 revealing mild obstructive sleep apnea. CPAP was started. My interpretation of compliance download is that she is using CPAP an average of 9 hours and 28 minutes per night. Apnea-hypopnea index with CPAP is 3.7 events per hour. She feels it helps significantly and can't sleep without it.       Continue the following medications as reported by the patient:    Prior to Visit Medications    Medication Sig Taking? Authorizing Provider   atenolol (TENORMIN) 25 MG tablet 1 tablet daily Indications: High Blood Pressure Yes Edmund Watts MD   simvastatin (ZOCOR) 20 MG tablet Take 1 tablet by mouth daily Indications: CHANGES IN CHOLESTEROL (INACTIVE) Yes Edmund Watts MD   triamterene-hydrochlorothiazide (DYAZIDE) 37.5-25 MG per capsule Take 1 capsule by mouth every morning Indications: High Blood Pressure Yes Edmund Watts MD   Sennosides-Docusate Sodium (CASEY-COLACE PO) Take 1 capsule by mouth daily Indications: Constipation  Yes Edmund Watts MD   calcium carbonate 600 MG TABS tablet Take 1 tablet by mouth daily Yes Edmund Watts MD   Multiple Vitamins-Minerals (MULTIVITAMIN ADULT PO) Take

## (undated) DEVICE — DRAPE PELV MICROVASIVE STD SHT W/ FLD PCH NONFENESTRATED

## (undated) DEVICE — TBG SMPL FLTR LINE NASL 02/C02 A/ BX/100

## (undated) DEVICE — MEDI-VAC YANK SUCT HNDL W/TPRD BULBOUS TIP: Brand: CARDINAL HEALTH

## (undated) DEVICE — JELLY LUBRICATING 4OZ FLIP TOP TB E Z

## (undated) DEVICE — ASTOUND STANDARD SURGICAL GOWN, XXL: Brand: CONVERTORS

## (undated) DEVICE — NEEDLE SPNL 25GA L3.5IN BLU HUB S STL REG WALL FIT STYL W/

## (undated) DEVICE — SOLUTION IV IRRIG POUR BRL 0.9% SODIUM CHL 2F7124

## (undated) DEVICE — SENSR O2 OXIMAX FNGR A/ 18IN NONSTR

## (undated) DEVICE — SUTURE CHROMIC GUT SZ 3-0 L27IN ABSRB BRN L26MM SH 1/2 CIR G122H

## (undated) DEVICE — Device: Brand: DEFENDO AIR/WATER/SUCTION AND BIOPSY VALVE

## (undated) DEVICE — MASK,OXYGEN,MED CONC,ADLT,7' TUB, UC: Brand: PENDING

## (undated) DEVICE — SEALER LAP SM L18.8CM OPN JAW HAND/FOOT SWCH FORCETRIAD

## (undated) DEVICE — YANKAUER,BULB TIP WITH VENT: Brand: ARGYLE

## (undated) DEVICE — GLOVE SURG SZ 7 CRM LTX FREE POLYISOPRENE POLYMER BEAD ANTI

## (undated) DEVICE — GLOVE SURG SZ 8 L12IN FNGR THK13MIL BRN LTX SYN POLYMER W

## (undated) DEVICE — THE CHANNEL CLEANING BRUSH IS A NYLON FLEXI BRUSH ATTACHED TO A FLEXIBLE PLASTIC SHEATH DESIGNED TO SAFELY REMOVE DEBRIS FROM FLEXIBLE ENDOSCOPES.

## (undated) DEVICE — ABDOMINAL PAD: Brand: DERMACEA

## (undated) DEVICE — MINOR CDS: Brand: MEDLINE INDUSTRIES, INC.